# Patient Record
Sex: FEMALE | Race: WHITE | Employment: FULL TIME | URBAN - METROPOLITAN AREA
[De-identification: names, ages, dates, MRNs, and addresses within clinical notes are randomized per-mention and may not be internally consistent; named-entity substitution may affect disease eponyms.]

---

## 2023-02-08 ENCOUNTER — HOSPITAL ENCOUNTER (EMERGENCY)
Age: 38
Discharge: HOME | End: 2023-02-08
Attending: EMERGENCY MEDICINE
Payer: COMMERCIAL

## 2023-02-08 ENCOUNTER — APPOINTMENT (OUTPATIENT)
Dept: GENERAL RADIOLOGY | Age: 38
End: 2023-02-08
Payer: COMMERCIAL

## 2023-02-08 VITALS
DIASTOLIC BLOOD PRESSURE: 79 MMHG | SYSTOLIC BLOOD PRESSURE: 122 MMHG | RESPIRATION RATE: 18 BRPM | HEART RATE: 84 BPM | BODY MASS INDEX: 37.77 KG/M2 | OXYGEN SATURATION: 98 % | TEMPERATURE: 98.1 F | WEIGHT: 235 LBS | HEIGHT: 66 IN

## 2023-02-08 DIAGNOSIS — S89.92XA INJURY OF LEFT KNEE, INITIAL ENCOUNTER: Primary | ICD-10-CM

## 2023-02-08 PROCEDURE — 73502 X-RAY EXAM HIP UNI 2-3 VIEWS: CPT

## 2023-02-08 PROCEDURE — 6360000002 HC RX W HCPCS: Performed by: EMERGENCY MEDICINE

## 2023-02-08 PROCEDURE — 73562 X-RAY EXAM OF KNEE 3: CPT

## 2023-02-08 PROCEDURE — 96372 THER/PROPH/DIAG INJ SC/IM: CPT

## 2023-02-08 PROCEDURE — 99284 EMERGENCY DEPT VISIT MOD MDM: CPT

## 2023-02-08 RX ORDER — KETOROLAC TROMETHAMINE 30 MG/ML
15 INJECTION, SOLUTION INTRAMUSCULAR; INTRAVENOUS ONCE
Status: COMPLETED | OUTPATIENT
Start: 2023-02-08 | End: 2023-02-08

## 2023-02-08 RX ORDER — CLONIDINE HYDROCHLORIDE 0.3 MG/1
0.3 TABLET ORAL 2 TIMES DAILY
COMMUNITY

## 2023-02-08 RX ORDER — GABAPENTIN 300 MG/1
300 CAPSULE ORAL 3 TIMES DAILY
COMMUNITY

## 2023-02-08 RX ORDER — NAPROXEN 500 MG/1
500 TABLET ORAL 2 TIMES DAILY
Qty: 14 TABLET | Refills: 0 | Status: SHIPPED | OUTPATIENT
Start: 2023-02-08

## 2023-02-08 RX ADMIN — KETOROLAC TROMETHAMINE 15 MG: 30 INJECTION, SOLUTION INTRAMUSCULAR at 11:51

## 2023-02-08 ASSESSMENT — PAIN SCALES - GENERAL
PAINLEVEL_OUTOF10: 10
PAINLEVEL_OUTOF10: 10

## 2023-02-08 ASSESSMENT — PAIN DESCRIPTION - ORIENTATION
ORIENTATION: LEFT
ORIENTATION: LEFT

## 2023-02-08 ASSESSMENT — PAIN DESCRIPTION - LOCATION
LOCATION: LEG
LOCATION: LEG

## 2023-02-08 ASSESSMENT — PAIN - FUNCTIONAL ASSESSMENT: PAIN_FUNCTIONAL_ASSESSMENT: 0-10

## 2023-02-08 NOTE — ED PROVIDER NOTES
Emergency Department Encounter    Patient: Lila Doty  MRN: [id number]  : 1985  Date of Evaluation: 2023  ED Provider:  Isabel Matias MD    MDM:    Clinical Impression:  1. Injury of left knee, initial encounter          Triage Chief Complaint: Leg Injury      Patient presents with left lower extremity pain as below. Additional history was obtained from: Patient    I completed a structured, evidence-based clinical evaluation to screen for acute emergent condition that poses a threat to life or bodily function. Diagnostic studies/Differential diagnosis included: X-ray evaluation of the left hip and pelvis as well as the left knee as interpreted by me revealed no evidence of acute fracture or dislocation. On exam, patient was able to stand and bear weight equally on the bilateral lower extremities. Patient is neurovascularly intact with no evidence of acute vascular insufficiency or neurologic injury. Knee is stable with no evidence of ACL, PCL, medial or lateral collateral ligament disruption. Patient has no evidence of patellar tendon injury. No breaks in the skin. No evidence of infectious emergency. Patient declined Ace bandage given latex allergy. She will consider neoprene sleeve. Crutches were provided. Patient will be treated with medications as below. Patient was treated with IM Toradol in the emergency department with some improvement. Patient will follow-up with occupational medicine. Medications ordered in the ED:  ED Medication Orders (From admission, onward)      Start Ordered     Status Ordering Provider    23 1130 23 1126  ketorolac (TORADOL) injection 15 mg  ONCE         Last MAR action: Given - by Vee Herrmann on 23 at [address] [place] [address]                PLAN  Disposition: Patient will be discharged with strict return precautions and follow up instructions.   Discharge - Pending Orders Complete 2023 12:16:38 PM     Disposition referral (if applicable):  [address] [place] [id number]  [phone]  Schedule an appointment as soon as possible for a visit       Medications prescribed (if applicable):  New Prescriptions    NAPROXEN (NAPROSYN) 500 MG TABLET    Take 1 tablet by mouth 2 times daily       Patient will additionally treat with RICE therapy.      ===================================================================    HPI/Pertinent ROS:  Ozzie Casillas is a 44 y.o. female that presents complaining of left lower extremity pain worse in the region of the knee and hip after an injury at work while the patient states that she was putting A 5 Gallon Water Jugs As They Moved on [employer]. Patient States That Her Arm Got Caught between 2 of the Jobs Pulling Her to the Side. Patient States That Her Leg Got Caught between 2 supporting bars. Causing pain. Patient states that she has been afraid to bear weight on the extremity since the injury noting that she heard some pops. No changes in color or sensation of the leg. No other pain or traumatic injury. Pt denies fall. Physical Exam:  Triage VS:    ED Triage Vitals   Enc Vitals Group      BP       Pulse       Resp       Temp       Temp src       SpO2       Weight       Height       Head Circumference       Peak Flow       Pain Score       Pain Loc       Pain Edu? Excl. in [address]? General appearance:  No acute distress. Skin:  Warm. Dry. Eye:  Extraocular movements intact. Ears, nose, mouth and throat:  Oral mucosa moist   Neck:  Trachea midline. Extremity:  No swelling. Normal ROM although there is pain with flexion of the knee and hip. Patient has no ligamentous instability of the ACL, PCL, medial or lateral collateral ligament of the knee. Patient is able to extend the knee against gravity with no evidence of patellar tendon injury. Patient has no pain in the hip with slight rocking of the hip. Pelvis stable. Patient has sensation intact to light touch throughout the left lower extremity. Palpable dorsalis pedis pulse. No breaks in the skin. No skin erythema. Other than tenderness to palpation on the posterior aspect of the left knee, no other bony tenderness to palpation of the long bones or joints of the left lower extremity. Heart:  Regular rate and rhythm, normal S1 & S2, no extra heart sounds. Perfusion:  intact  Respiratory:  Lungs clear to auscultation bilaterally. Respirations nonlabored. Abdominal:  Normal bowel sounds. Soft. Nontender. Non distended. Neurological:  Alert and oriented times 3. No focal neuro deficits. Psychiatric:  Appropriate    Past Medical History:   Diagnosis Date    ADHD     Anxiety     Borderline personality disorder in adult [facility])     Depression     Endometriosis      Past Surgical History:   Procedure Laterality Date    APPENDECTOMY       SECTION      x2    FOOT SURGERY Left     x3, achilles tendon    TONSILLECTOMY       History reviewed. No pertinent family history.   Social History     Socioeconomic History    Marital status: Single     Spouse name: Not on file    Number of children: Not on file    Years of education: Not on file    Highest education level: Not on file   Occupational History    Not on file   Tobacco Use    Smoking status: Former     Types: Cigarettes    Smokeless tobacco: Never   Vaping Use    Vaping Use: Every day   Substance and Sexual Activity    Alcohol use: Never    Drug use: Never    Sexual activity: Not on file   Other Topics Concern    Not on file   Social History Narrative    Not on file     Social Determinants of Health     Financial Resource Strain: Not on file   Food Insecurity: Not on file   Transportation Needs: Not on file   Physical Activity: Not on file   Stress: Not on file   Social Connections: Not on file   Intimate Partner Violence: Not on file   Housing Stability: Not on file     No current facility-administered medications for this encounter. Current Outpatient Medications   Medication Sig Dispense Refill    Cariprazine HCl (VRAYLAR) 6 MG CAPS capsule Take 6 mg by mouth daily      gabapentin (NEURONTIN) 300 MG capsule Take 300 mg by mouth 3 times daily. cloNIDine (CATAPRES) 0.3 MG tablet Take 0.3 mg by mouth 2 times daily      sertraline (ZOLOFT) 50 MG tablet Take 50 mg by mouth daily      naproxen (NAPROSYN) 500 MG tablet Take 1 tablet by mouth 2 times daily 14 tablet 0     Allergies   Allergen Reactions    Latex Other (See Comments)     blisters    Adhesive Tape Other (See Comments)     blisters    Amoxicillin Hives    Penicillins Hives       Nursing Notes Reviewed    I have reviewed and interpreted all of the currently available lab results from this visit (if applicable):  No results found for this visit on 02/08/23. Radiographs (if obtained):  Radiologist's Report Reviewed:  XR KNEE LEFT (3 VIEWS)   Final Result   Apparent multi compartment joint space narrowing which may reflect artifact   associated with suboptimal patient positioning. Otherwise, no acute   radiographic abnormality left knee. XR HIP 2-3 VW W PELVIS LEFT   Final Result   No acute radiographic abnormality bony pelvis or left hip as visualized. Pelvic soft tissue calcifications most consistent with phleboliths measuring   5 mm in greatest dimension. Urinary tract stone or appendicolith cannot be   completely excluded. Comment: Please note this report has been produced using speech recognition software and may contain errors related to that system including errors in grammar, punctuation, and spelling, as well as words and phrases that may be inappropriate. Efforts were made to edit the dictations.         Dale Knapp MD  02/08/23 1171

## 2023-02-08 NOTE — ED NOTES
Pt verbalized understanding of discharge medication/side effects and follow-up care, denies any questions or concerns at this time. Pt demonstrated correct use of crutches; encouraged to return to the ED for any new or worsening symptoms.      Lucio Krabbe, RN  02/08/23 2625

## 2023-02-08 NOTE — DISCHARGE INSTRUCTIONS
Return immediately to the emergency department if you experience new or worsened symptoms, inability to bear weight, changes in color or sensation of your leg, or for any other concerns.

## 2023-02-23 ENCOUNTER — TELEPHONE (OUTPATIENT)
Dept: ORTHOPEDIC SURGERY | Age: 38
End: 2023-02-23

## 2023-02-27 ENCOUNTER — OFFICE VISIT (OUTPATIENT)
Dept: ORTHOPEDIC SURGERY | Age: 38
End: 2023-02-27

## 2023-02-27 VITALS
HEART RATE: 96 BPM | OXYGEN SATURATION: 96 % | HEIGHT: 66 IN | BODY MASS INDEX: 37.77 KG/M2 | DIASTOLIC BLOOD PRESSURE: 82 MMHG | SYSTOLIC BLOOD PRESSURE: 120 MMHG | WEIGHT: 235 LBS

## 2023-02-27 DIAGNOSIS — M23.92 INTERNAL DERANGEMENT OF LEFT KNEE: Primary | ICD-10-CM

## 2023-02-27 RX ORDER — DEXTROAMPHETAMINE SACCHARATE, AMPHETAMINE ASPARTATE MONOHYDRATE, DEXTROAMPHETAMINE SULFATE AND AMPHETAMINE SULFATE 7.5; 7.5; 7.5; 7.5 MG/1; MG/1; MG/1; MG/1
CAPSULE, EXTENDED RELEASE ORAL
COMMUNITY

## 2023-02-27 ASSESSMENT — ENCOUNTER SYMPTOMS
NAUSEA: 0
BACK PAIN: 0
WHEEZING: 0
COLOR CHANGE: 0
VOMITING: 0
VOICE CHANGE: 0
SHORTNESS OF BREATH: 0
COUGH: 0
SORE THROAT: 0

## 2023-02-27 NOTE — PROGRESS NOTES
Patient is a 44y.o. year old female. Patient is in the office today with left knee pain. Patient states that she injured themselves while at work. She states that her arm go pinned between 2 water bottles and her foot was also stuck and her knee twisted. She thinks her knee twisted medially. She states that she felt and heard several pops. Patient states that the injury happened on 2/8/23. She was seen in the ED the same day. X-rays were taken and she was given crutches. She used the crutches for a couple days. She has not been wearing a knee brace. She states that her knee has been giving out and she has pain and creaking with stepping. . Pain scale  0/10. She has increase pain with rotation and stairs. She rates that pain at 5/10. He pain is located inferior to the patella and she reports pressure around her patella. She denies a prior hx of injury or surgery.    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

## 2023-02-27 NOTE — PATIENT INSTRUCTIONS
MRI ordered. Central scheduling [phone]  Knee brace given today. Work note given today. Follow up for MRI results.    Venkat Barcenas  - for [facility] paperwork

## 2023-02-27 NOTE — PROGRESS NOTES
2/27/2023   Chief Complaint   Patient presents with    Injury     Left knee        History of Present Illness:                             Romeo Mao is a 44 y.o. female  referred by emergency room for evaluation and treatment of left knee pain. This is evaluated as a Worker's Compensation injury. Patient states that approximately 3 weeks ago she was injured at work when her knee was stuck between 2 large water containers and she had a twisting injury to the knee that caused a painful pop. She was seen that day at the emergency room and x-rays were negative for any type of fracture and was placed on crutches which she used for several days. She was unable to use a knee brace as it did not fit her proximal thigh. She has been held off of work because of her being on light duty and they cannot accommodate this. She has no advanced imaging thus far. This is my first time seeing the patient. The pain's location is diffusely. she describes the symptoms as aching, sharp and stabbing. Symptoms improve with rest. Symptoms worsen with deep knee bending, getting up from a chair, weight bearing, sitting for prolonged periods of time, twisting activities. Patient denies prior injury to knee, denies numbness, tingling, fever, chills. Patient does admit to prior left Achilles pain and several surgeries but no knee surgery and no prior knee issue. Patient admits to diffuse swelling and minimal.  Patient admits to intermittent and minimal mechanical symptoms. Admits to sensation of instability. Worse with increased activity. Better with rest,     Treatment thus far has included rest, activity modifications, bracing, ice, oral medications without significant relief. Here today to discuss diagnosis and treatment options. Is affecting ADLs. Pain is 6/10 at it's worst.    No advanced imaging thus far    Outside reports reviewed: Emergency room note and imaging reviewed.     Patient's medications, allergies, past medical, surgical, social and family histories were reviewed and updated as appropriate. Medical History  Patient's medications, allergies, past medical, surgical, social and family histories were reviewed and updated as appropriate. Past Medical History:   Diagnosis Date    ADHD     Anxiety     Borderline personality disorder in adult [facility])     Depression     Endometriosis      Past Surgical History:   Procedure Laterality Date    APPENDECTOMY       SECTION      x2    FOOT SURGERY Left     x3, achilles tendon    TONSILLECTOMY       No family history on file. Social History     Socioeconomic History    Marital status: Single   Tobacco Use    Smoking status: Former     Types: Cigarettes    Smokeless tobacco: Never   Vaping Use    Vaping Use: Every day   Substance and Sexual Activity    Alcohol use: Never    Drug use: Never     Current Outpatient Medications   Medication Sig Dispense Refill    amphetamine-dextroamphetamine (ADDERALL XR) 30 MG extended release capsule Take by mouth. Cariprazine HCl (VRAYLAR) 6 MG CAPS capsule Take 6 mg by mouth daily      gabapentin (NEURONTIN) 300 MG capsule Take 300 mg by mouth 3 times daily. cloNIDine (CATAPRES) 0.3 MG tablet Take 0.3 mg by mouth 2 times daily      sertraline (ZOLOFT) 50 MG tablet Take 50 mg by mouth daily      naproxen (NAPROSYN) 500 MG tablet Take 1 tablet by mouth 2 times daily (Patient not taking: Reported on 2023) 14 tablet 0     No current facility-administered medications for this visit. Allergies   Allergen Reactions    Latex Other (See Comments)     blisters    Adhesive Tape Other (See Comments)     blisters    Amoxicillin Hives    Penicillins Hives         Review of Systems   Constitutional:  Positive for activity change. Negative for fatigue and fever. HENT:  Negative for sneezing, sore throat and voice change. Respiratory:  Negative for cough, shortness of breath and wheezing. Cardiovascular:  Negative for leg swelling. Gastrointestinal:  Negative for nausea and vomiting. Musculoskeletal:  Positive for arthralgias, joint swelling and myalgias. Negative for back pain, gait problem, neck pain and neck stiffness. Skin:  Negative for color change, rash and wound. Neurological:  Negative for weakness and numbness. Psychiatric/Behavioral:  Negative for behavioral problems, confusion and self-injury. Examination:  General Exam:  Vitals: /82   Pulse 96   Ht 5' 6\" (1.676 m)   Wt 235 lb (106.6 kg)   LMP 02/08/2023   SpO2 96%   BMI 37.93 kg/m²    Physical Exam  Constitutional:       General: She is not in acute distress. Appearance: Normal appearance. She is obese. HENT:      Head: Normocephalic and atraumatic. Eyes:      General:         Right eye: No discharge. Left eye: No discharge. Extraocular Movements: Extraocular movements intact. Cardiovascular:      Pulses: Normal pulses. Pulmonary:      Effort: Pulmonary effort is normal.      Breath sounds: Normal breath sounds. Musculoskeletal:         General: Swelling, tenderness and signs of injury present. No deformity. Cervical back: Normal range of motion. Right hip: Normal.      Left hip: Normal.      Right upper leg: Normal.      Left upper leg: Normal.      Left knee: Swelling, bony tenderness and crepitus present. No deformity, effusion, erythema or ecchymosis. Normal range of motion. Tenderness present over the medial joint line and lateral joint line. No LCL laxity, MCL laxity, ACL laxity or PCL laxity. Abnormal meniscus. Normal alignment and normal patellar mobility. Normal pulse. Instability Tests: Anterior drawer test negative. Posterior drawer test negative. Anterior Lachman test negative. Medial Dinesh test positive and lateral Dinesh test positive. Right lower leg: Normal. No edema.       Left lower leg: Normal. No edema.      Right ankle: Normal.      Left ankle: Normal.      Right foot: Normal.      Left foot: Normal.   Skin:     General: Skin is warm and dry. Capillary Refill: Capillary refill takes less than 2 seconds. Neurological:      General: No focal deficit present. Mental Status: She is alert and oriented to person, place, and time. Mental status is at baseline. Gait: Gait normal.   Psychiatric:         Mood and Affect: Mood normal.         Behavior: Behavior normal.      LEFT KNEE EXAMINATION       OBSERVATION / INSPECTION     Gait: Mildly antalgic    Alignment: Neutral     Scars: None     Muscle atrophy: None    Effusion: Minimal    Warmth: None     Discoloration: none       TENDERNESS / CREPITUS (T / C): Patella - / -     Lateral joint line + / -  Medial joint line  + / -     Peripatellar lateral - / -  Peripatellar medial  - / -     Medial plica - / -  Lateral plica - / -    Patellar tendon - / -   Prepatellar Bursa - / -     Popliteal fossa - / -    Gastrocnemius - / -    Quadricep - / -   Quad tendon - / -      Tibial tubercle - / -     Thigh/hamstring - / -  Pes anserine/HS - / -     ITB - / -     Tibia - / -   Fibula - / -    Tib/fib joint - / -     MFC - / -    LFC - / -     MCL: Proximal - / -  Distal - / -    LCL - / -    MCL - / -      ROM: (* = pain)  PASSIVE  ACTIVE     Left :    0 / 145  0 / 145      Right :    0 / 145  0 / 145      PATELLOFEMORAL EXAMINATION:    See above noted areas of tenderness.      Patella position     Subluxation / dislocation: Centered     Sup. / Inf; Normal     Crepitus (PF): Mild    Patellar Mobility:     Medial-lateral: Normal     Superior-inferior: Normal     Inferior tilt Normal     Patellar tendon: Normal     Lateral tilt: Normal     J-sign: None     Patellofemoral grind: Mild pain         MENISCAL SIGNS:     Pain on terminal extension: -    Pain on terminal flexion: -    Dineshs maneuver: Positive for pain only medially and laterally    Squat: Not tested      LIGAMENT EXAMINATION:    ACL / Lachman: normal but difficult to perform due to patient's size and guarding    PCL-Post.  drawer: normal 0 to 2mm    MCL- Valgus: normal 0 to 2mm    LCL- Varus:  normal 0 to 2mm      STRENGTH: (* = with pain) PAINFUL SIDE    Quadricep 5/5    Hamstrin/5      EXTREMITY NEURO-VASCULAR EXAMINATION:     Sensation:  Grossly intact to light touch all dermatomal regions. Motor Function:  Fully intact motor function at hip, knee, foot and ankle      DTRs;  quadriceps and  achilles 2+. No clonus and downgoing Babinski. Vascular status:  DP and PT pulses 2+, brisk capillary refill, symmetric. Diagnostic testing:  X-ray images were reviewed by myself and discussed with the patient:  3 views of the left knee from emergency room demonstrate:  No radiographic evidence of acute fracture, dislocation, joint effusion, bone   destruction, malignant-appearing periosteal reaction. Apparent joint space   narrowing noted which may partially reflect projection artifact due to   positioning. No radiographic evidence of acute soft tissue abnormality. Office Procedures:  No orders of the defined types were placed in this encounter. Assessment and Plan    A: Left knee internal derangement    P:   I had a thorough conversation with the patient regarding her left knee x-ray imaging as well as her treatment course. I explained that it is difficult to truly assess for any type of ACL tear due to her significant guarding but it does not appear to be significantly torn as I cannot appear to get an endpoint. However, I explained that there are times where the swelling as well as the guarding can give a false negative test and I do feel that proceeding with an MRI was appropriate as she also has significant joint line pain and mechanical symptoms. At this time we will proceed with MRI of the left knee.   Until it is completed and she follows up in office she will continue restrictions including no weightbearing knee bending but she can do knee bending and range of motion was seated. She will also avoid any type of exercise, high-impact activity or heavy lifting. We will also place her in a knee brace today from her office which did seem to fit better. She will follow-up after MRI is completed. She will continue with ice and over-the-counter pain medication for pain control. All questions were answered and patient voices her understanding.     Electronically signed by Marge King DO on 2/27/2023 at 1:16 PM

## 2023-02-27 NOTE — LETTER
[facility]  [address] [place] [address]  Phone: [phone]  Fax: [phone]    Barry Palmer DO        February 27, 2023     Patient: Anjali Rosenberg   YOB: 1985   Date of Visit: 2/27/2023       To Whom It May Concern: It is my medical opinion that Anjali Rosenberg may return to light duty immediately with the following restrictions: no weight bearing squating, exercise and must be able to take breaks as needed. Must wear brace while at work. .    If you have any questions or concerns, please don't hesitate to call.     Sincerely,        Barry Palmer DO

## 2023-02-28 ENCOUNTER — HOSPITAL ENCOUNTER (OUTPATIENT)
Dept: MRI IMAGING | Age: 38
Discharge: HOME | End: 2023-02-28
Payer: COMMERCIAL

## 2023-02-28 DIAGNOSIS — M23.92 INTERNAL DERANGEMENT OF LEFT KNEE: ICD-10-CM

## 2023-02-28 PROCEDURE — 73721 MRI JNT OF LWR EXTRE W/O DYE: CPT

## 2023-03-01 ENCOUNTER — OFFICE VISIT (OUTPATIENT)
Dept: ORTHOPEDIC SURGERY | Age: 38
End: 2023-03-01

## 2023-03-01 ENCOUNTER — TELEPHONE (OUTPATIENT)
Dept: ORTHOPEDIC SURGERY | Age: 38
End: 2023-03-01

## 2023-03-01 VITALS
HEIGHT: 66 IN | BODY MASS INDEX: 37.77 KG/M2 | SYSTOLIC BLOOD PRESSURE: 139 MMHG | WEIGHT: 235 LBS | HEART RATE: 96 BPM | OXYGEN SATURATION: 98 % | DIASTOLIC BLOOD PRESSURE: 90 MMHG

## 2023-03-01 DIAGNOSIS — S83.512A COMPLETE TEAR OF ANTERIOR CRUCIATE LIGAMENT OF LEFT KNEE, INITIAL ENCOUNTER: Primary | ICD-10-CM

## 2023-03-01 ASSESSMENT — ENCOUNTER SYMPTOMS
BACK PAIN: 0
VOMITING: 0
SHORTNESS OF BREATH: 0
NAUSEA: 0
VOICE CHANGE: 0
COUGH: 0
WHEEZING: 0
SORE THROAT: 0
COLOR CHANGE: 0

## 2023-03-01 NOTE — LETTER
March 1, 2023       Amparo Arreaga YOB: 1985   [place] [id number] Date of Visit:  3/1/2023       To Whom It May Concern: It is my medical opinion that Amparo Arreaga should remain out of work until until cleared by physician after surgery. .    If you have any questions or concerns, please don't hesitate to call.     Sincerely,        Lexy People, DO

## 2023-03-01 NOTE — PATIENT INSTRUCTIONS
Surgery discussed, office will call patient with surgery date      We are committed to providing you the best care possible. If you receive a survey after visiting one of our offices, please take time to share your experience concerning your physician office visit. These surveys are confidential and no health information about you is shared. We are eager to improve for you and we are counting on your feedback to help make that happen.

## 2023-03-01 NOTE — PROGRESS NOTES
3/1/2023   Chief Complaint   Patient presents with    Follow-up     F/U MRI RESULTS L KNEE        Updated HPI: Patient is here for follow-up left knee including MRI review. She has no new complaints. She has been utilizing a knee brace but still has issues with it sliding down like the other one was. No new injuries or complaints. She has been limiting her activities per my request.  Continues with issues with sensation of instability but no mechanical symptoms at this time. No changes in her health history. Previous HPI (2/7/2023):                             Fabian Johnson is a 44 y.o. female  referred by emergency room for evaluation and treatment of left knee pain. This is evaluated as a Worker's Compensation injury. Patient states that approximately 3 weeks ago she was injured at work when her knee was stuck between 2 large water containers and she had a twisting injury to the knee that caused a painful pop. She was seen that day at the emergency room and x-rays were negative for any type of fracture and was placed on crutches which she used for several days. She was unable to use a knee brace as it did not fit her proximal thigh. She has been held off of work because of her being on light duty and they cannot accommodate this. She has no advanced imaging thus far. This is my first time seeing the patient. The pain's location is diffusely. she describes the symptoms as aching, sharp and stabbing. Symptoms improve with rest. Symptoms worsen with deep knee bending, getting up from a chair, weight bearing, sitting for prolonged periods of time, twisting activities. Patient denies prior injury to knee, denies numbness, tingling, fever, chills. Patient does admit to prior left Achilles pain and several surgeries but no knee surgery and no prior knee issue. Patient admits to diffuse swelling and minimal.  Patient admits to intermittent and minimal mechanical symptoms.   Admits to sensation of instability. Worse with increased activity. Better with rest,     Treatment thus far has included rest, activity modifications, bracing, ice, oral medications without significant relief. Here today to discuss diagnosis and treatment options. Is affecting ADLs. Pain is 6/10 at it's worst.    No advanced imaging thus far    Outside reports reviewed: Emergency room note and imaging reviewed. Patient's medications, allergies, past medical, surgical, social and family histories were reviewed and updated as appropriate. Medical History  Patient's medications, allergies, past medical, surgical, social and family histories were reviewed and updated as appropriate. Past Medical History:   Diagnosis Date    ADHD     Anxiety     Borderline personality disorder in adult [facility])     Depression     Endometriosis      Past Surgical History:   Procedure Laterality Date    APPENDECTOMY       SECTION      x2    FOOT SURGERY Left     x3, achilles tendon    TONSILLECTOMY       No family history on file. Social History     Socioeconomic History    Marital status: Single   Tobacco Use    Smoking status: Former     Types: Cigarettes    Smokeless tobacco: Never   Vaping Use    Vaping Use: Every day   Substance and Sexual Activity    Alcohol use: Never    Drug use: Never     Current Outpatient Medications   Medication Sig Dispense Refill    amphetamine-dextroamphetamine (ADDERALL XR) 30 MG extended release capsule Take by mouth. Cariprazine HCl (VRAYLAR) 6 MG CAPS capsule Take 6 mg by mouth daily      gabapentin (NEURONTIN) 300 MG capsule Take 300 mg by mouth 3 times daily.       cloNIDine (CATAPRES) 0.3 MG tablet Take 0.3 mg by mouth 2 times daily      sertraline (ZOLOFT) 50 MG tablet Take 50 mg by mouth daily      naproxen (NAPROSYN) 500 MG tablet Take 1 tablet by mouth 2 times daily (Patient not taking: Reported on 2023) 14 tablet 0     No current facility-administered medications for this visit. Allergies   Allergen Reactions    Latex Other (See Comments)     blisters    Adhesive Tape Other (See Comments)     blisters    Amoxicillin Hives    Penicillins Hives         Review of Systems   Constitutional:  Positive for activity change. Negative for fatigue and fever. HENT:  Negative for sneezing, sore throat and voice change. Respiratory:  Negative for cough, shortness of breath and wheezing. Cardiovascular:  Negative for leg swelling. Gastrointestinal:  Negative for nausea and vomiting. Musculoskeletal:  Positive for arthralgias, joint swelling and myalgias. Negative for back pain, gait problem, neck pain and neck stiffness. Skin:  Negative for color change, rash and wound. Neurological:  Negative for weakness and numbness. Psychiatric/Behavioral:  Negative for behavioral problems, confusion and self-injury. Examination:  General Exam:  Vitals: BP (!) 139/90 (Site: Right Upper Arm, Position: Sitting)   Pulse 96   Ht 5' 6\" (1.676 m)   Wt 235 lb (106.6 kg) Comment: PATIENT REPORTED  LMP 02/08/2023   SpO2 98%   BMI 37.93 kg/m²    Physical Exam  Constitutional:       General: She is not in acute distress. Appearance: Normal appearance. She is obese. HENT:      Head: Normocephalic and atraumatic. Eyes:      General:         Right eye: No discharge. Left eye: No discharge. Extraocular Movements: Extraocular movements intact. Cardiovascular:      Pulses: Normal pulses. Pulmonary:      Effort: Pulmonary effort is normal.      Breath sounds: Normal breath sounds. Musculoskeletal:         General: Swelling, tenderness and signs of injury present. No deformity. Cervical back: Normal range of motion. Right hip: Normal.      Left hip: Normal.      Right upper leg: Normal.      Left upper leg: Normal.      Left knee: Swelling, bony tenderness and crepitus present.  No deformity, effusion, erythema or ecchymosis. Normal range of motion. Tenderness present over the medial joint line and lateral joint line. ACL laxity present. No LCL laxity, MCL laxity or PCL laxity. Normal alignment, normal meniscus and normal patellar mobility. Normal pulse. Instability Tests: Anterior drawer test positive. Posterior drawer test negative. Anterior Lachman test negative. Medial Dinesh test negative and lateral Dinesh test negative. Right lower leg: Normal. No edema. Left lower leg: Normal. No edema. Right ankle: Normal.      Left ankle: Normal.      Right foot: Normal.      Left foot: Normal.   Skin:     General: Skin is warm and dry. Capillary Refill: Capillary refill takes less than 2 seconds. Neurological:      General: No focal deficit present. Mental Status: She is alert and oriented to person, place, and time. Mental status is at baseline. Gait: Gait normal.   Psychiatric:         Mood and Affect: Mood normal.         Behavior: Behavior normal.      LEFT KNEE EXAMINATION       OBSERVATION / INSPECTION     Gait: Mildly antalgic    Alignment: Neutral     Scars: None     Muscle atrophy: None    Effusion: Minimal    Warmth: None     Discoloration: none       TENDERNESS / CREPITUS (T / C):       Patella - / -     Lateral joint line + / -  Medial joint line  + / -     Peripatellar lateral - / -  Peripatellar medial  - / -     Medial plica - / -  Lateral plica - / -    Patellar tendon - / -   Prepatellar Bursa - / -     Popliteal fossa - / -    Gastrocnemius - / -    Quadricep - / -   Quad tendon - / -      Tibial tubercle - / -     Thigh/hamstring - / -  Pes anserine/HS - / -     ITB - / -     Tibia - / -   Fibula - / -    Tib/fib joint - / -     MFC - / -    LFC - / -     MCL: Proximal - / -  Distal - / -    LCL - / -    MCL - / -      ROM: (* = pain)  PASSIVE  ACTIVE     Left :    0 / 145  0 / 145      Right :    0 / 145  0 / 145      PATELLOFEMORAL EXAMINATION:    See above noted areas of tenderness. Patella position     Subluxation / dislocation: Centered     Sup. / Inf; Normal     Crepitus (PF): Mild    Patellar Mobility:     Medial-lateral: Normal     Superior-inferior: Normal     Inferior tilt Normal     Patellar tendon: Normal     Lateral tilt: Normal     J-sign: None     Patellofemoral grind: Mild pain         MENISCAL SIGNS:     Pain on terminal extension: -    Pain on terminal flexion: -    Dineshs maneuver: Positive for pain only medially and laterally    Squat: Not tested      LIGAMENT EXAMINATION:    ACL / Lachman: 1B but continued guarding    PCL-Post.  drawer: normal 0 to 2mm    MCL- Valgus: normal 0 to 2mm    LCL- Varus:  normal 0 to 2mm      STRENGTH: (* = with pain) PAINFUL SIDE    Quadricep 5/5    Hamstrin/5      EXTREMITY NEURO-VASCULAR EXAMINATION:     Sensation:  Grossly intact to light touch all dermatomal regions. Motor Function:  Fully intact motor function at hip, knee, foot and ankle      DTRs;  quadriceps and  achilles 2+. No clonus and downgoing Babinski. Vascular status:  DP and PT pulses 2+, brisk capillary refill, symmetric. Diagnostic testing:  MRI left knee imaging without contrast were reviewed by myself and discussed with the patient:  MENISCI: Intact medial and lateral menisci. CRUCIATE LIGAMENTS: There is an acute tear of the ACL. PCL is intact. EXTENSOR MECHANISM: Intact quadriceps and patellar tendons. Intact patellar   retinacula. LATERAL COLLATERAL LIGAMENT COMPLEX: Intact IT band, lateral collateral   ligament proper, biceps femoris tendon and popliteus tendon. MEDIAL COLLATERAL LIGAMENT COMPLEX: The superficial and deep components of   the medial collateral ligament are intact. KNEE JOINT: There is a moderate joint effusion. Joint alignment is   maintained. Minimal tricompartmental degenerative changes. No erosions are   present.        BONE MARROW: There are acute medial and lateral compartment marrow   contusions. No evidence of displaced fracture or osteochondral defect. Small nondisplaced subchondral fracture involving the posterior margin of the   lateral tibial plateau. Previous imaging:   3 views of the left knee from emergency room demonstrate:  No radiographic evidence of acute fracture, dislocation, joint effusion, bone   destruction, malignant-appearing periosteal reaction. Apparent joint space   narrowing noted which may partially reflect projection artifact due to   positioning. No radiographic evidence of acute soft tissue abnormality. Office Procedures:  No orders of the defined types were placed in this encounter. Assessment and Plan    A: Left knee ACL tear    P:   I discussed with the patient pursuing  conservative measures versus operative intervention and what both treatment plans would encompass, as well as the benefits and negatives of both interventions. Due to the patient's significant symptoms including stability and pain, they would like to proceed with surgical intervention. I did discuss with the patient the use of hamstring or bone patellar bone autograft as well as cadaver allograft at this time because of the patient's age and activity level, we will proceed with allograft. She voiced understanding in regards to the use of allograft during the case. I discussed surgical intervention in the form of:    Left Knee arthroscopic:  ACL reconstruction utilizing quadriceps allograft      I explained risks, benefits, possible complications of the procedure and answered all questions for the patient. This will include but is not limited to need for further surgery, blood loss, continued pain and instability, neurovascular injury, infection, extremity loss, death. I explained postoperative rehabilitation protocol and expectations with the patient today. The patient understands and consents to the procedure. Patient will not need clearance. We will have to schedule the case once it is cleared by [facility].     Electronically signed by Rossana Brown DO on 3/1/2023 at 3:08 PM

## 2023-03-01 NOTE — PROGRESS NOTES
Patient returns to office today to follow up for MRI RESULTS L KNEE     Patient reports 4/10 pain. Patient states that knee brace is falling down and wants to know if we can give her a bigger size brace    MRI RESULTS    1. Acute ACL tear. 2. Medial and lateral compartment marrow contusions with small nondisplaced   subchondral impaction fracture involving the posterior margin of the lateral   tibial plateau. 3. Moderate joint effusion.

## 2023-03-01 NOTE — TELEPHONE ENCOUNTER
[facility] [phone]  Fax [phone]    S83. 92XA SPRAIN OF UNSPECIFIED SITE OF LEFT KNEE LEFT  ALLOWED 2/23/2023    Internal claim # [id number]

## 2023-03-09 ENCOUNTER — TELEPHONE (OUTPATIENT)
Dept: ORTHOPEDIC SURGERY | Age: 38
End: 2023-03-09

## 2023-03-09 NOTE — TELEPHONE ENCOUNTER
Patent needs approval from [facility] for surgery.     CPT 99049 with Allograft  [id number]

## 2023-03-13 NOTE — TELEPHONE ENCOUNTER
[address] for addl code completed and faxed to [facility] No

## 2023-03-17 NOTE — TELEPHONE ENCOUNTER
Patient called and left message that her surgery was approved by [facility] and would like to be scheduled. I will call call her on Monday.

## 2023-03-17 NOTE — TELEPHONE ENCOUNTER
I called the [address]. Phone # [phone]. Internal claim # [id number]    Message requested an update on the addl dx code that our office requested.

## 2023-03-20 ENCOUNTER — OFFICE VISIT (OUTPATIENT)
Dept: ORTHOPEDIC SURGERY | Age: 38
End: 2023-03-20

## 2023-03-20 ENCOUNTER — TELEPHONE (OUTPATIENT)
Dept: ORTHOPEDIC SURGERY | Age: 38
End: 2023-03-20

## 2023-03-20 VITALS — HEART RATE: 110 BPM | OXYGEN SATURATION: 96 % | DIASTOLIC BLOOD PRESSURE: 76 MMHG | SYSTOLIC BLOOD PRESSURE: 110 MMHG

## 2023-03-20 DIAGNOSIS — S83.512A RUPTURE OF ANTERIOR CRUCIATE LIGAMENT OF LEFT KNEE, INITIAL ENCOUNTER: Primary | ICD-10-CM

## 2023-03-20 DIAGNOSIS — S83.512D COMPLETE TEAR OF ANTERIOR CRUCIATE LIGAMENT OF LEFT KNEE, SUBSEQUENT ENCOUNTER: Primary | ICD-10-CM

## 2023-03-20 RX ORDER — AZITHROMYCIN 250 MG/1
1 TABLET, FILM COATED ORAL DAILY
COMMUNITY
Start: 2023-03-18

## 2023-03-20 RX ORDER — CEFDINIR 300 MG/1
300 CAPSULE ORAL 2 TIMES DAILY
COMMUNITY
Start: 2023-03-16 | End: 2023-03-20 | Stop reason: CLARIF

## 2023-03-20 ASSESSMENT — ENCOUNTER SYMPTOMS
VOICE CHANGE: 0
NAUSEA: 0
SHORTNESS OF BREATH: 0
COUGH: 0
BACK PAIN: 0
SORE THROAT: 0
VOMITING: 0
COLOR CHANGE: 0
WHEEZING: 0

## 2023-03-20 NOTE — PATIENT INSTRUCTIONS
We will schedule surgery at soonest convenience.  If you have any questions regarding your surgery please call our office and ask to speak with Lidia Napier [phone]

## 2023-03-20 NOTE — PROGRESS NOTES
Patient states she was on methimazole 5 mg for 10 days but she was having an upset stomach and states it was causing a pain in the stents in her neck to hurt. Patient was unable to provide specific symptoms. States she stopped taking the methimazole about \"a week and a half ago\" and now she feels fine. Informed this information will be passed on to MD for review.       
Patient, 44year old female, is here to discuss surgery for left knee ACL. She states this is approved through [facility]. No new injuries, numbness or tingling. Pain 3.10 today. Already in post op brace.
Pt called stating that she is having complications with her new medication methIMAzole (TAPAZOLE) 5 MG tablet. Pt is requesting a call from the nurse to discuss other options. PT stopped medication for now.   
Returned call to patient. Patient agreed to resume methimazole 5mg QOD. Instructed to call right away if she is having any issues. Patient agreed with plan.     MAR updated.  
See if she can try taking it  1/2 tab daily or 1 tab every other day   
reconstruction utilizing quadriceps allograft      I explained risks, benefits, possible complications of the procedure and answered all questions for the patient. This will include but is not limited to need for further surgery, blood loss, continued pain and instability, neurovascular injury, infection, extremity loss, death. I explained postoperative rehabilitation protocol and expectations with the patient today. The patient understands and consents to the procedure. Patient will not need clearance. Apparently the case has been cleared by [facility] but we are awaiting the official paperwork. We will go ahead and schedule her at this time. Patient states that she has a possible metal allergy but has never been tested. I did discuss this with her and this is not a true documented allergy but she uses plastic piercings because of this. I have reached out to the [employer] reps and we will try to determine if there is any alternative to the metal aspects of the suspensory fixation. Patient also states she has adhesive allergy and I will make sure that we avoid using any type of adhesive or glue during the surgery.     Electronically signed by Meena Marin DO on 3/20/2023 at 9:08 AM

## 2023-03-20 NOTE — TELEPHONE ENCOUNTER
Scheduled patient in office for LT ACL Reconstruction (Allograft) on 4/11/23 at [facility]. Patient voiced understanding of date/time and instructions. Procedure request faxed. Patient states she does not have a PCP. She just recently moved to [place] from [facility] and has not been able to get established anywhere. This surgery is to be done thru [facility]    Claim#: [id number]    [facility]#: [id number]

## 2023-04-11 PROBLEM — S83.242A ACUTE MEDIAL MENISCUS TEAR OF LEFT KNEE: Status: ACTIVE | Noted: 2023-04-11

## 2023-04-18 ENCOUNTER — HOSPITAL ENCOUNTER (OUTPATIENT)
Dept: PHYSICAL THERAPY | Age: 38
Setting detail: THERAPIES SERIES
Discharge: HOME | End: 2023-04-18
Payer: COMMERCIAL

## 2023-04-18 PROCEDURE — 97161 PT EVAL LOW COMPLEX 20 MIN: CPT

## 2023-04-18 PROCEDURE — 97110 THERAPEUTIC EXERCISES: CPT

## 2023-04-18 PROCEDURE — 97016 VASOPNEUMATIC DEVICE THERAPY: CPT

## 2023-04-18 ASSESSMENT — PAIN SCALES - GENERAL: PAINLEVEL_OUTOF10: 7

## 2023-04-18 ASSESSMENT — PAIN DESCRIPTION - PAIN TYPE: TYPE: ACUTE PAIN

## 2023-04-18 NOTE — PLAN OF CARE
Manual Therapy               [] Aquatic Therapy       Other:          Frequency/Duration:  # Days per week: [] 1 day # Weeks: [] 1 week [] 5 weeks     [x] 2 days   [] 2 weeks [x] 6 weeks     [] 3 days   [] 3 weeks [] 7 weeks     [] 4 days   [] 4 weeks [] 8 weeks         [] 9 weeks [] 10 weeks         [] 11 weeks [] 12 weeks    Rehab Potential/Progress: [] Excellent [x] Good [] Fair  [] Poor     Goals:    Patient goals: improve function and return to work full time  Short term goals  Time Frame for Short term goals: 10 weeks  Pt demo I w/ HEP and symptom management  Pt demo LEFS score >40/80 to improve tolerance to ADL's  Pt demo ability to ambulate >800 ft in 6 minutes without crutches and brace, pain at 0/10  Pt demo >4/5 LLE strength in all directions to improve tolerance to ADL's  Pt demo x10 SLR without quad lag and pain at 0/10  Long Term Goals  Time Frame for Long Term Goals: 20 weeks  Pt demo LEFS score >50/80 to improve tolerance to ADL's  Pt demo ability to ascend/descend 1 flight of stairs with x1 handrail and reciprocal pattern  Pt is able to return in full duty to work with pain <3/10  Pt reports ability to walk >1 mile without brace or crutches and pain at 0/10    Electronically signed by:  Juan Lundy PT, DPT, 4/18/2023, 2:17 PM        If you have any questions or concerns, please don't hesitate to call.   Thank you for your referral.      Physician Signature:________________________________Date:_________ TIME: _____  By signing above, therapists plan is approved by physician

## 2023-04-18 NOTE — PROGRESS NOTES
quad lag and pain at 0/10 New                                     Long Term Goals Completed by 20 weeks Goal Status   Pt demo LEFS score >50/80 to improve tolerance to ADL's New   Pt demo ability to ascend/descend 1 flight of stairs with x1 handrail and reciprocal pattern New   Pt is able to return in full duty to work with pain <3/10 New   Pt reports ability to walk >1 mile without brace or crutches and pain at 0/10 New     New                                      TREATMENT PLAN       Requires PT Follow-Up: Yes    Pt. actively involved in establishing Plan of Care and Goals: Yes  Patient/ Caregiver education and instruction: Goals, PT Role, Plan of Care, Evaluative findings, Home Exercise Program, Weight-bearing Education, Pressure Relief, Gait Training, Anatomy of condition, Disease Specific Education, Energy Conservation, General Safety, Body mechanics discussed 50% WB with crutches locked in brace, sleeping in brace, frequency of HEP, driving           Treatment may include any combination of the following: Current Treatment Recommendations: Strengthening, ROM, Balance training, Home exercise program, Therapeutic activities, Gait training, Stair training, Transfer training, Neuromuscular re-education, Manual, Modalities, Pain management, Endurance training, Return to work related activity     Frequency / Duration:  Patient to be seen 2x for 20 weeks weeks      Eval Complexity:    Decision Making: Low Complexity     Therapist Signature: Lieutenant Escobedo PT    Date: 3/36/6030     I certify that the above Therapy Services are being furnished while the patient is under my care. I agree with the treatment plan and certify that this therapy is necessary.       Physician's Signature:  ___________________________   Date:_______                                                                   Snow Azul DO        Physician Comments: _______________________________________________    Please sign and return to [facility]

## 2023-04-18 NOTE — FLOWSHEET NOTE
measurements around extremity were pre:43 cm/ post 41 cm  See subjective and assessment for pre and post treatment patient reported pain levels. Communication with other providers:  eval sent 4/18/23      Assessment:  (Response towards treatment session) (Pain Rating)  Assessment: Pt is 44year old female who underwent a left knee ACL repair utilizing quadriceps allograft with meniscus repair on 4/11/23 and is currently one week post-op. Patient arrives with her brace donned and crutches, currently not bearing weight. Discussed progressions to 50% WB in brace w/ crutches. Provided home program for patient to complete daily. Demonstrated how patient can sit with brace unlocked at 90 deg. She has been icing frequently at home. Pt will benefit with PT services with progression of strength/ROM, manual, electrical stimulation, balance/proprioception, and modalities to return to PLOF.       Plan for Next Session: --       Time In / Time Out:    1592-2732      Timed Code/Total Treatment Minutes:   (1) PT eval  (1) TE  (1) vaso      Next Progress Note due:  10th visit       Plan of Care Interventions:  [x] Therapeutic Exercise  [] Modalities:  [x] Therapeutic Activity     [] Ultrasound  [x] Estim  [x] Gait Training      [] Cervical Traction [] Lumbar Traction  [x] Neuromuscular Re-education    [] Cold/hotpack [] Iontophoresis   [x] Instruction in HEP      [x] Vasopneumatic   [] Dry Needling    [x] Manual Therapy               [] Aquatic Therapy              Electronically signed by:  Lieutenant Fanny PT, 4/18/2023, 2:17 PM

## 2023-04-20 ENCOUNTER — HOSPITAL ENCOUNTER (OUTPATIENT)
Dept: PHYSICAL THERAPY | Age: 38
Setting detail: THERAPIES SERIES
Discharge: HOME | End: 2023-04-20
Payer: COMMERCIAL

## 2023-04-20 PROCEDURE — 97110 THERAPEUTIC EXERCISES: CPT

## 2023-04-20 PROCEDURE — 97112 NEUROMUSCULAR REEDUCATION: CPT

## 2023-04-20 PROCEDURE — 97016 VASOPNEUMATIC DEVICE THERAPY: CPT

## 2023-04-20 NOTE — FLOWSHEET NOTE
currently not bearing weight. Discussed progressions to 50% WB in brace w/ crutches. Provided home program for patient to complete daily. Demonstrated how patient can sit with brace unlocked at 90 deg. She has been icing frequently at home. Pt will benefit with PT services with progression of strength/ROM, manual, electrical stimulation, balance/proprioception, and modalities to return to PLOF. Subjective:  Abimbola Campo reports pain at 6/10 this morning. She has been trying to walk at home, but it has been hard for her to get her foot down ont he ground. Any changes in Ambulatory Summary Sheet? None      Objective:  See eval     LIMIT flexion to 90 deg for first 6 weeks   Brace locked at 0 deg for ambulation, 0-90 deg at rest  50% WB w/ crutches for 6 weeks     Knee Extension: 4 deg lacking from zero   Knee Flexion: 82 deg AAROM seated w/ contralateral side       Exercises: (No more than 4 columns)   Exercise/Equipment 4/18/23 #1 4/20/23 #2 Date      4/18/23=1 wk 4/25=2wks    WARM UP      Nu Step    NEXT brace unlocked to 90 deg Lv3 5'          TABLE      PROM knee flexion 0-90 deg      *Quad Set X10 3\" E-stim 5' w/ towel    *SLR 20 deg only in brace Unable at this time    Heel Slide X10 5\" X10 5\"    SL Hip ABD         Add Squeeze       Seated Knee Flexion X10 5\" X10 5\"    Supine TKE   E-stim GTB 5'    Heel Prop  2' w/ quad set    STANDING      DL Heel Raises  NEXT    Gait Training w/ crutches 20 ft with 50% WB Arrival of session around clinic with brace + crutches, 50% WB                                       PROPRIOCEPTION      DLS airex       Weight Shifts  X10 A/P + lateral X20 A/P + lateral                       MODALITIES      vaso  10'              Other Therapeutic Activities/Education:  Patient received education on their current pathology and how their condition effects them with their functional activities. Patient understood discussion and questions were answered.  Patient understands their

## 2023-04-25 ENCOUNTER — HOSPITAL ENCOUNTER (OUTPATIENT)
Dept: PHYSICAL THERAPY | Age: 38
Setting detail: THERAPIES SERIES
Discharge: HOME | End: 2023-04-25
Payer: COMMERCIAL

## 2023-04-25 PROCEDURE — 97110 THERAPEUTIC EXERCISES: CPT

## 2023-04-25 PROCEDURE — 97140 MANUAL THERAPY 1/> REGIONS: CPT

## 2023-04-25 PROCEDURE — 97530 THERAPEUTIC ACTIVITIES: CPT

## 2023-04-25 PROCEDURE — G0283 ELEC STIM OTHER THAN WOUND: HCPCS

## 2023-04-25 NOTE — FLOWSHEET NOTE
Outpatient Physical Therapy  [place]           [x] Phone: [phone]   Fax: [phone]  [facility]           [] Phone: [phone]   Fax: [phone]        Physical Therapy Daily Treatment Note  Date:  2023    Patient Name:  Amos Elise    :  1985  MRN: [id number]  Restrictions/Precautions: SEE Dr. Raúl Aguirre Protocol in folder  Diagnosis:   Rupture of anterior cruciate ligament of left knee, initial encounter [L69.614B]    Date of Injury/Surgery: 23  Treatment Diagnosis:  Decreased LLE WB tolerance, strength, mobility  Insurance/Certification information: [facility] APPROVED 12 VISITS BY 23  Referring Physician:  DO Dr. Raúl Garrison   PCP: No primary care provider on file. Next Doctor Visit:  --  Plan of care signed (Y/N):  N, sent 23  Outcome Measure: LEFS: see folder  Visit# / total visits:   3/12 by 23  Pain level: 4/10   Goals:     Patient goals: improve function and return to work full time  Short term goals  Time Frame for Short term goals: 10 weeks  Pt demo I w/ HEP and symptom management  Pt demo LEFS score >40/80 to improve tolerance to ADL's  Pt demo ability to ambulate >800 ft in 6 minutes without crutches and brace, pain at 0/10  Pt demo >4/5 LLE strength in all directions to improve tolerance to ADL's  Pt demo x10 SLR without quad lag and pain at 0/10  Long Term Goals  Time Frame for Long Term Goals: 20 weeks  Pt demo LEFS score >50/80 to improve tolerance to ADL's  Pt demo ability to ascend/descend 1 flight of stairs with x1 handrail and reciprocal pattern  Pt is able to return in full duty to work with pain <3/10  Pt reports ability to walk >1 mile without brace or crutches and pain at 0/10      Summary of Evaluation:  Assessment: Pt is 44year old female who underwent a left knee ACL repair utilizing quadriceps allograft with meniscus repair on 23 and is currently one week post-op.  Patient arrives with her brace donned and crutches,

## 2023-04-26 ENCOUNTER — OFFICE VISIT (OUTPATIENT)
Dept: ORTHOPEDIC SURGERY | Age: 38
End: 2023-04-26

## 2023-04-26 VITALS — DIASTOLIC BLOOD PRESSURE: 86 MMHG | SYSTOLIC BLOOD PRESSURE: 122 MMHG | OXYGEN SATURATION: 98 % | HEART RATE: 120 BPM

## 2023-04-26 DIAGNOSIS — Z98.890 S/P ACL RECONSTRUCTION: Primary | ICD-10-CM

## 2023-04-26 RX ORDER — LISDEXAMFETAMINE DIMESYLATE 40 MG/1
1 CAPSULE ORAL DAILY
COMMUNITY
Start: 2023-04-20

## 2023-04-26 RX ORDER — HYDROCODONE BITARTRATE AND ACETAMINOPHEN 5; 325 MG/1; MG/1
1 TABLET ORAL EVERY 6 HOURS PRN
Qty: 28 TABLET | Refills: 0 | Status: SHIPPED | OUTPATIENT
Start: 2023-04-26 | End: 2023-05-03

## 2023-04-26 RX ORDER — CYCLOBENZAPRINE HCL 5 MG
5 TABLET ORAL 2 TIMES DAILY PRN
Qty: 30 TABLET | Refills: 0 | Status: SHIPPED | OUTPATIENT
Start: 2023-04-26 | End: 2023-05-06

## 2023-04-26 NOTE — PROGRESS NOTES
Patient is here for 2 week post op check on left ACL repair DOS 4/11/23. Patient denies new injury. No numbness or tingling. Some numbness with icing. Has been wearing brace as directed. Complaints: During PT yesterday, therapist was moving kneecap around and now she feels a grinding but is not painful. Her whole left leg is swelling as well but no visible swelling today. Is getting baptized this weekend, asking if she needs to keep her knee covered for this. Questions: Return to work timeline. Asking about muscle relaxers to take after PT for pain.
motion as outlined in the protocol  -rest/elevation as needed  -DVT prophylaxis: Continue  mg daily  -Refills for Flexeril and Norco sent to pharmacy  -Discussed restrictions including no soaking the wound  -Continue off work at this time. I explained to the patient that she should expect to not return to work for at least 3 months postop but would likely be longer and she voiced understanding.   We will provide appropriate documentation as needed  -f/u in 4 week(s)  -f/u sooner prn any issues

## 2023-04-29 ENCOUNTER — HOSPITAL ENCOUNTER (OUTPATIENT)
Dept: PHYSICAL THERAPY | Age: 38
Setting detail: THERAPIES SERIES
Discharge: HOME | End: 2023-04-29
Payer: COMMERCIAL

## 2023-04-29 PROCEDURE — 97110 THERAPEUTIC EXERCISES: CPT

## 2023-04-29 PROCEDURE — 97140 MANUAL THERAPY 1/> REGIONS: CPT

## 2023-04-29 PROCEDURE — 97112 NEUROMUSCULAR REEDUCATION: CPT

## 2023-04-29 PROCEDURE — 97016 VASOPNEUMATIC DEVICE THERAPY: CPT

## 2023-04-29 NOTE — FLOWSHEET NOTE
Outpatient Physical Therapy  [place]           [x] Phone: [phone]   Fax: [phone]  [facility]           [] Phone: [phone]   Fax: [phone]        Physical Therapy Daily Treatment Note  Date:  2023    Patient Name:  Aleida Aguila    :  1985  MRN: [id number]  Restrictions/Precautions: SEE Dr. Anil Jin Protocol in folder  Diagnosis:   Rupture of anterior cruciate ligament of left knee, initial encounter [N35.285D]    Date of Injury/Surgery: 23  Treatment Diagnosis:  Decreased LLE WB tolerance, strength, mobility  Insurance/Certification information: [facility] APPROVED 12 VISITS BY 23  Referring Physician:  DO Dr. Anil Boyle   PCP: No primary care provider on file. Next Doctor Visit:  --  Plan of care signed (Y/N):  N, sent 23  Outcome Measure: LEFS: see folder  Visit# / total visits:    by 23  Pain level: 6-7/10   Goals:     Patient goals: improve function and return to work full time  Short term goals  Time Frame for Short term goals: 10 weeks  Pt demo I w/ HEP and symptom management  Pt demo LEFS score >40/80 to improve tolerance to ADL's  Pt demo ability to ambulate >800 ft in 6 minutes without crutches and brace, pain at 0/10  Pt demo >4/5 LLE strength in all directions to improve tolerance to ADL's  Pt demo x10 SLR without quad lag and pain at 0/10  Long Term Goals  Time Frame for Long Term Goals: 20 weeks  Pt demo LEFS score >50/80 to improve tolerance to ADL's  Pt demo ability to ascend/descend 1 flight of stairs with x1 handrail and reciprocal pattern  Pt is able to return in full duty to work with pain <3/10  Pt reports ability to walk >1 mile without brace or crutches and pain at 0/10      Summary of Evaluation:  Assessment: Pt is 44year old female who underwent a left knee ACL repair utilizing quadriceps allograft with meniscus repair on 23 and is currently one week post-op.  Patient arrives with her brace donned and crutches,

## 2023-05-03 ENCOUNTER — HOSPITAL ENCOUNTER (OUTPATIENT)
Dept: PHYSICAL THERAPY | Age: 38
Setting detail: THERAPIES SERIES
Discharge: HOME | End: 2023-05-03
Payer: COMMERCIAL

## 2023-05-03 PROCEDURE — 97110 THERAPEUTIC EXERCISES: CPT

## 2023-05-03 PROCEDURE — 97016 VASOPNEUMATIC DEVICE THERAPY: CPT

## 2023-05-03 PROCEDURE — 97112 NEUROMUSCULAR REEDUCATION: CPT

## 2023-05-03 NOTE — FLOWSHEET NOTE
[id number]        Timed Code/Total Treatment Minutes:   45'/55'  (2) TE  (1) NEURO  (1) VASO      Next Progress Note due:  10th visit       Plan of Care Interventions:  [x] Therapeutic Exercise  [] Modalities:  [x] Therapeutic Activity     [] Ultrasound  [x] Estim  [x] Gait Training      [] Cervical Traction [] Lumbar Traction  [x] Neuromuscular Re-education    [] Cold/hotpack [] Iontophoresis   [x] Instruction in HEP      [x] Vasopneumatic   [] Dry Needling    [x] Manual Therapy               [] Aquatic Therapy              Electronically signed by:  Tony Wells PT,DPT5/3/2023, 8:31 AM        5/3/2023,8:31 AM

## 2023-05-05 ENCOUNTER — HOSPITAL ENCOUNTER (OUTPATIENT)
Dept: PHYSICAL THERAPY | Age: 38
Setting detail: THERAPIES SERIES
Discharge: HOME | End: 2023-05-05
Payer: COMMERCIAL

## 2023-05-05 PROCEDURE — 97112 NEUROMUSCULAR REEDUCATION: CPT

## 2023-05-05 PROCEDURE — 97110 THERAPEUTIC EXERCISES: CPT

## 2023-05-05 PROCEDURE — 97016 VASOPNEUMATIC DEVICE THERAPY: CPT

## 2023-05-05 NOTE — FLOWSHEET NOTE
Outpatient Physical Therapy  [place]           [x] Phone: [phone]   Fax: [phone]  [facility]           [] Phone: [phone]   Fax: [phone]        Physical Therapy Daily Treatment Note  Date:  2023    Patient Name:  Angeles Thacker    :  1985  MRN: [id number]  Restrictions/Precautions: SEE Dr. Terence Klinefelter Protocol in folder  Diagnosis:   Rupture of anterior cruciate ligament of left knee, initial encounter [O51.580I]    Date of Injury/Surgery: 23  Treatment Diagnosis:  Decreased LLE WB tolerance, strength, mobility  Insurance/Certification information: [facility] APPROVED 12 VISITS BY 23  Referring Physician:  Abbey Duncan, DO Dr. Terence Klinefelter   PCP: No primary care provider on file. Next Doctor Visit:  --  Plan of care signed (Y/N):  Y  Outcome Measure: LEFS: see folder  Visit# / total visits:    by 23  Pain level: 0/10       Goals:     Patient goals: improve function and return to work full time  Short term goals  Time Frame for Short term goals: 10 weeks  Pt demo I w/ HEP and symptom management reports compliance 5/3/23  Pt demo LEFS score >40/80 to improve tolerance to ADL's  Pt demo ability to ambulate >800 ft in 6 minutes without crutches and brace, pain at 0/10  Pt demo >4/5 LLE strength in all directions to improve tolerance to ADL's  Pt demo x10 SLR without quad lag and pain at 0/10 Min lag 5/5  Long Term Goals  Time Frame for Long Term Goals: 20 weeks  Pt demo LEFS score >50/80 to improve tolerance to ADL's  Pt demo ability to ascend/descend 1 flight of stairs with x1 handrail and reciprocal pattern  Pt is able to return in full duty to work with pain <3/10  Pt reports ability to walk >1 mile without brace or crutches and pain at 0/10      Summary of Evaluation:  Assessment: Pt is 44year old female who underwent a left knee ACL repair utilizing quadriceps allograft with meniscus repair on 23 and is currently one week post-op.  Patient arrives with her brace

## 2023-05-11 ENCOUNTER — HOSPITAL ENCOUNTER (OUTPATIENT)
Dept: PHYSICAL THERAPY | Age: 38
Setting detail: THERAPIES SERIES
Discharge: HOME | End: 2023-05-11
Payer: COMMERCIAL

## 2023-05-11 PROCEDURE — 97112 NEUROMUSCULAR REEDUCATION: CPT

## 2023-05-11 PROCEDURE — 97110 THERAPEUTIC EXERCISES: CPT

## 2023-05-11 PROCEDURE — 97016 VASOPNEUMATIC DEVICE THERAPY: CPT

## 2023-05-11 NOTE — FLOWSHEET NOTE
Outpatient Physical Therapy  [place]           [x] Phone: [phone]   Fax: [phone]  [facility]           [] Phone: [phone]   Fax: [phone]        Physical Therapy Daily Treatment Note  Date:  2023    Patient Name:  Liban Ayon    :  1985  MRN: [id number]  Restrictions/Precautions: SEE Dr. Dana Whittaker Protocol in folder  Diagnosis:   Rupture of anterior cruciate ligament of left knee, initial encounter [O20.935Y]    Date of Injury/Surgery: 23  Treatment Diagnosis:  Decreased LLE WB tolerance, strength, mobility  Insurance/Certification information: [facility] APPROVED 12 VISITS BY 23  Referring Physician:  DO Dr. Dana Nascimento   PCP: No primary care provider on file. Next Doctor Visit:  --  Plan of care signed (Y/N):  Y  Outcome Measure: LEFS: see folder  Visit# / total visits:    by 23  Pain level: 0/10       Goals:     Patient goals: improve function and return to work full time  Short term goals  Time Frame for Short term goals: 10 weeks  Pt demo I w/ HEP and symptom management reports compliance 5/3/23  Pt demo LEFS score >40/80 to improve tolerance to ADL's  Pt demo ability to ambulate >800 ft in 6 minutes without crutches and brace, pain at 0/10  Pt demo >4/5 LLE strength in all directions to improve tolerance to ADL's  Pt demo x10 SLR without quad lag and pain at 0/10 Min lag 5/5  Long Term Goals  Time Frame for Long Term Goals: 20 weeks  Pt demo LEFS score >50/80 to improve tolerance to ADL's  Pt demo ability to ascend/descend 1 flight of stairs with x1 handrail and reciprocal pattern  Pt is able to return in full duty to work with pain <3/10  Pt reports ability to walk >1 mile without brace or crutches and pain at 0/10      Summary of Evaluation:  Assessment: Pt is 44year old female who underwent a left knee ACL repair utilizing quadriceps allograft with meniscus repair on 23 and is currently one week post-op.  Patient arrives with her

## 2023-05-18 ENCOUNTER — HOSPITAL ENCOUNTER (OUTPATIENT)
Dept: PHYSICAL THERAPY | Age: 38
Discharge: HOME | End: 2023-05-18

## 2023-05-18 NOTE — FLOWSHEET NOTE
Physical Therapy  Cancellation/No-show Note  Patient Name:  Ovidio Rojas  :  1985   Date:  2023  Cancelled visits to date: 1  No-shows to date: 2    For today's appointment patient:  [x]  Cancelled  []  Rescheduled appointment  []  No-show     Reason given by patient:  []  Patient ill  []  Conflicting appointment  []  No transportation    []  Conflict with work  []  No reason given  [x]  Other:    Comments:  Has transferred care to another facility closer to home.      Electronically signed by:  Mindi Toro PTA      9:14 AM  2023

## 2023-05-24 ENCOUNTER — OFFICE VISIT (OUTPATIENT)
Dept: ORTHOPEDIC SURGERY | Age: 38
End: 2023-05-24

## 2023-05-24 ENCOUNTER — TELEPHONE (OUTPATIENT)
Dept: ORTHOPEDIC SURGERY | Age: 38
End: 2023-05-24

## 2023-05-24 VITALS — DIASTOLIC BLOOD PRESSURE: 88 MMHG | OXYGEN SATURATION: 97 % | HEART RATE: 77 BPM | SYSTOLIC BLOOD PRESSURE: 132 MMHG

## 2023-05-24 DIAGNOSIS — Z98.890 S/P ACL RECONSTRUCTION: Primary | ICD-10-CM

## 2023-05-24 PROCEDURE — 99024 POSTOP FOLLOW-UP VISIT: CPT | Performed by: STUDENT IN AN ORGANIZED HEALTH CARE EDUCATION/TRAINING PROGRAM

## 2023-05-24 NOTE — PROGRESS NOTES
Patient is here for 6 week post op check on left ACL repair DOS 4/11/23. Patient has no complaints, questions, or concerns,. Pain minimal and improving. No pain today. Swelling decreasing. No numbness or tingling. No injuries. Has been wearing brace and wrap.

## 2023-05-24 NOTE — PROGRESS NOTES
Date of surgery: 4-     Procedure:  1. Diagnostic and operative arthroscopy of left knee with ACL  reconstruction using quadriceps tendon allograft size 9.0 utilizing Arthrex quadriceps tendon harvesting system. 2.  Left knee arthroscopy with medial meniscus repair utilizing Arthrex all suture meniscal repair system  x 1      History:  Patient is here in follow up regarding their 6-week postop visit for above left knee procedure    Patient is doing well. They have 0/10 pain currently and states that this has been well controlled for the last several weeks. They deny chest pain, SOB, calf pain,fever,wound drainage. No other issues. Patient denies any constitutional symptoms. She feels that she has been progressing well and is very happy with her progress thus far. She does admit that physical therapy has been interrupted as she did move approximately 3 hours away but has reestablished physical therapy care in her new home town. Patient states they have been compliant with restrictions. She states that physical therapy is going well    Patient states they have been using the brace as ordered    Patient has been ambulating with crutches and has been compliant with limiting her weightbearing    Patient has been taking  mg daily for DVT prophylaxis     Physical:   Patient demonstrates appropriate mood and affect. Hinged knee brace removed for examination and placed back on the knee after examination complete    Left lower extremity exam:   The incisions are well-healed and are clean, dry, intact, and nontender with no erythema. They have no edema, the Leg compartments are soft . There are No cords or calf tenderness. No significant calf/ankle edema. They are neurovascularly intact distally.      Range of motion of the left knee demonstrates: 0-90 without pain    Minimal rosemary-incisional tenderness to palpation    Lachman's 1A with no mechanical symptoms with range of motion    Imaging:   No

## 2023-05-24 NOTE — PATIENT INSTRUCTIONS
Follow up in 6 weeks. Please bring Physical Therapy notes from [facility] to your next visit.

## 2023-07-10 ENCOUNTER — OFFICE VISIT (OUTPATIENT)
Dept: ORTHOPEDIC SURGERY | Age: 38
End: 2023-07-10

## 2023-07-10 VITALS — BODY MASS INDEX: 40.31 KG/M2 | OXYGEN SATURATION: 99 % | HEIGHT: 66 IN | HEART RATE: 87 BPM | WEIGHT: 250.8 LBS

## 2023-07-10 DIAGNOSIS — Z98.890 S/P ACL RECONSTRUCTION: Primary | ICD-10-CM

## 2023-07-10 PROCEDURE — 99024 POSTOP FOLLOW-UP VISIT: CPT | Performed by: STUDENT IN AN ORGANIZED HEALTH CARE EDUCATION/TRAINING PROGRAM

## 2023-07-10 RX ORDER — MELOXICAM 15 MG/1
15 TABLET ORAL DAILY PRN
Qty: 30 TABLET | Refills: 0 | Status: SHIPPED | OUTPATIENT
Start: 2023-07-10

## 2023-07-10 NOTE — PROGRESS NOTES
Patient comes in today for a 12 week post-op check for left ACL repair on 4/11/23. She was last seen in our office on 5/24/23. She rates her pain at 2/10 today. She is still bruising around her VMO and would like to know why. She is also asking about pain meds that are not narcotics. She states that she is currently in PT and doing well. She denies any new falls or injuries.

## 2023-07-10 NOTE — PROGRESS NOTES
Date of surgery: 4-     Procedure:  1. Diagnostic and operative arthroscopy of left knee with ACL  reconstruction using quadriceps tendon allograft size 9.0 utilizing Arthrex quadriceps tendon harvesting system. 2.  Left knee arthroscopy with medial meniscus repair utilizing Arthrex all suture meniscal repair system  x 1      History:  Patient is here in follow up regarding their 12-week postop visit for above left knee procedure    Patient is doing well. They have 2/10 pain currently and states that her pain continues to be well controlled. Her only concern is some bruising around the VMO from an unknown issue otherwise she is doing well. They deny chest pain, SOB, calf pain,fever,wound drainage. No other issues. Patient denies any constitutional symptoms. She feels that she has been progressing well and is very happy with her progress thus far. She states that her physical therapy has been not problematic but she does not feel that she is being pushed very hard and that there focuses more on geriatric type patients and she is looking to move her physical therapy to a more aggressive facility as she would like to progress quicker. Patient states they have been compliant with restrictions. She states that physical therapy is going well    No new injuries or instability events since last being seen. Denies any mechanical symptoms. Patient states they have been using the brace as ordered    Has been ambulating without knee brace and crutches without issue. Physical:   Patient demonstrates appropriate mood and affect. Left lower extremity exam:   The incisions are well-healed and are clean, dry, intact, and nontender with no erythema. They have no edema, the Leg compartments are soft . There are No cords or calf tenderness. Minimal bruising around VMO but no pain. No significant calf/ankle edema. They are neurovascularly intact distally.      Range of motion of the left knee

## 2023-07-11 ENCOUNTER — TELEPHONE (OUTPATIENT)
Dept: ORTHOPEDIC SURGERY | Age: 38
End: 2023-07-11

## 2023-07-11 NOTE — TELEPHONE ENCOUNTER
Pt's [facility] NCM called stating that the pt wants to RTW with restrictions. I called the pt and confirmed that she wants to RTW with restrictions of no standing greater the 45 min with a 15 min break. New [employer] completed and faxed.

## 2023-08-23 ENCOUNTER — OFFICE VISIT (OUTPATIENT)
Dept: ORTHOPEDIC SURGERY | Age: 38
End: 2023-08-23

## 2023-08-23 VITALS — RESPIRATION RATE: 14 BRPM | WEIGHT: 250 LBS | HEIGHT: 66 IN | BODY MASS INDEX: 40.18 KG/M2

## 2023-08-23 DIAGNOSIS — Z98.890 S/P ACL RECONSTRUCTION: ICD-10-CM

## 2023-08-23 RX ORDER — MELOXICAM 15 MG/1
15 TABLET ORAL DAILY PRN
Qty: 30 TABLET | Refills: 0 | Status: SHIPPED | OUTPATIENT
Start: 2023-08-23

## 2023-08-23 ASSESSMENT — ENCOUNTER SYMPTOMS
COLOR CHANGE: 0
BACK PAIN: 0
SORE THROAT: 0
NAUSEA: 0
WHEEZING: 0
COUGH: 0
VOICE CHANGE: 0
VOMITING: 0
SHORTNESS OF BREATH: 0

## 2023-08-23 NOTE — PROGRESS NOTES
8/23/2023   No chief complaint on file. History of Present Illness:                             Nahed Murphy is a 43 y.o. female       Date of surgery: 4-     Procedure:  1. Diagnostic and operative arthroscopy of left knee with ACL  reconstruction using quadriceps tendon allograft size 9.0 utilizing Arthrex quadriceps tendon harvesting system. 2.  Left knee arthroscopy with medial meniscus repair utilizing Arthrex all suture meniscal repair system  x 1      History:  Patient is here in follow up regarding their 4 month postop visit for above left knee procedure    Patient is doing well. They have 3/10 pain currently but states that it has been increased recently up to a 5 as she has been doing more activity at work. She left the previous job which was under the [employer] injury and switch jobs which does not give her the light duty that she needs and she states that this has been very aggravating to her knee. She had no new injuries but again states that she is on her feet and probably doing more than she should and her knee feels very painful and sore after a long day at work. She attempted to ice the knee while at work. She was using a knee brace which was previously provided by myself but she states that she ripped the brace. She has no new injuries or any sensation of instability. They deny chest pain, SOB, calf pain,fever,wound drainage. No other issues. Patient denies any constitutional symptoms. She admits that she has missed some physical therapy and this is somewhat limited her. She states that is because she has been working more and has missed appointments because of this as well as the fact that she has moved to [facility] recently. Patient states they have been compliant with restrictions.   She states that physical therapy is going well when she does go and she tries to do the exercises on her own but states she has not been very compliant with

## 2023-08-23 NOTE — PROGRESS NOTES
Patient is here today for a post op check for her left knee ACL repair 4/11/23. She states that her pain level is 5/10 but increases with activity. She has been completing some physical therapy but her work schedule has made this difficult to attend. She is standing on her feet all day with frequent breaks. She is also on hourly restrictions. 2 consecutive days 4 days a week 10 hr days.

## 2023-08-23 NOTE — PATIENT INSTRUCTIONS
Compound cream Prescription  Hold off on cortisone injection  We will return in 8 weeks we will discuss cortisone injection at a time  She will call if her knee pain worsens and she wants injection and we will move up her appointment  Focus on physical therapy and less work  Figure out light duty at work

## 2023-09-07 ENCOUNTER — TELEPHONE (OUTPATIENT)
Dept: ORTHOPEDIC SURGERY | Age: 38
End: 2023-09-07

## 2023-09-07 NOTE — TELEPHONE ENCOUNTER
Just spoke with patient about the swelling that she is having in her knee. She states that the swelling will go down once she is off of it but comes back as soon as she is standing on it. She denies an new injury but states that she has been on it a lot. She thinks she may be over doing it. She is asking for a new work note. Her follow up appointment on 10/18/23 was moved up to 9/15/23. Please advise.

## 2023-09-07 NOTE — TELEPHONE ENCOUNTER
Regarding: FW: Rdoy Denis   Contact: [phone]  Can you reach out to this patient to get more specifics on what is going on and see if we can get her in sometime the next 1 to 2 weeks for reevaluation.  ----- Message -----  From: Oskar Luciano MA  Sent: 9/6/2023   8:19 AM EDT  To: Aaron Haro DO  Subject: Rody Denis                                ----- Message from Oskar Luciano MA sent at 9/6/2023  8:19 AM EDT -----       ----- Message from [id number] to Aaron HaroDO sent at 9/5/2023 11:49 AM -----   Hello, I'm getting ahold of you in regards to my acl surgery back on April 11th. I had my follow up on 8,23,23. We had talked about me going back on workers comp. To help heal my knee better. We talked about less work and more physical therapy. My workers comp nurse told me to ask my physical therapist for a at home care because she wasn't sure if working and walking two days on and two days off would be enough exercise to strengthen it. ? Over the holiday weekend I had off I was taking it at easy and I woke up Saturday and couldn't apply pressure on my knee? It was swollen and did subside within two days but my concern is that even with the light duty I'm harming it more? Please get back to me and advise me what dr Felisa Espinal would like to do in regards to this situation.    Thanks   Rody Denis

## 2023-09-08 ENCOUNTER — HOSPITAL ENCOUNTER (OUTPATIENT)
Dept: DATA CONVERSION | Facility: HOSPITAL | Age: 38
End: 2023-09-08

## 2023-09-08 DIAGNOSIS — G47.33 OBSTRUCTIVE SLEEP APNEA (ADULT) (PEDIATRIC): ICD-10-CM

## 2023-09-25 ENCOUNTER — HOSPITAL ENCOUNTER (OUTPATIENT)
Dept: DATA CONVERSION | Facility: HOSPITAL | Age: 38
End: 2023-09-25

## 2023-09-25 DIAGNOSIS — M67.01 SHORT ACHILLES TENDON (ACQUIRED), RIGHT ANKLE: ICD-10-CM

## 2023-09-28 DIAGNOSIS — Z98.890 S/P ACL RECONSTRUCTION: ICD-10-CM

## 2023-10-05 RX ORDER — MELOXICAM 15 MG/1
15 TABLET ORAL DAILY PRN
Qty: 30 TABLET | Refills: 0 | Status: SHIPPED | OUTPATIENT
Start: 2023-10-05

## 2023-10-09 ENCOUNTER — OFFICE VISIT (OUTPATIENT)
Dept: ORTHOPEDIC SURGERY | Age: 38
End: 2023-10-09

## 2023-10-09 VITALS — HEART RATE: 90 BPM | SYSTOLIC BLOOD PRESSURE: 130 MMHG | OXYGEN SATURATION: 98 % | DIASTOLIC BLOOD PRESSURE: 86 MMHG

## 2023-10-09 DIAGNOSIS — Z98.890 S/P ACL RECONSTRUCTION: Primary | ICD-10-CM

## 2023-10-09 RX ORDER — MELOXICAM 15 MG/1
15 TABLET ORAL DAILY PRN
Qty: 30 TABLET | Refills: 2 | Status: SHIPPED | OUTPATIENT
Start: 2023-10-09 | End: 2024-01-07

## 2023-10-09 RX ORDER — TRIAMCINOLONE ACETONIDE 40 MG/ML
40 INJECTION, SUSPENSION INTRA-ARTICULAR; INTRAMUSCULAR ONCE
Status: COMPLETED | OUTPATIENT
Start: 2023-10-09 | End: 2023-10-09

## 2023-10-09 RX ADMIN — TRIAMCINOLONE ACETONIDE 40 MG: 40 INJECTION, SUSPENSION INTRA-ARTICULAR; INTRAMUSCULAR at 15:31

## 2023-10-09 ASSESSMENT — ENCOUNTER SYMPTOMS
WHEEZING: 0
COUGH: 0
COLOR CHANGE: 0
VOMITING: 0
BACK PAIN: 0
NAUSEA: 0
SORE THROAT: 0
VOICE CHANGE: 0
SHORTNESS OF BREATH: 0

## 2023-10-09 NOTE — PROGRESS NOTES
Patient comes in today for a 6 month follow up post left ACL repair on 4/11/23. She was last seen in our office on 8/23/23 She rates her pain at 2/10 today. She reports medial sided pain and buckling with prolonged walking. She reports that she has not been working since September 8th. She states that her work was pushing her and it was causing more pain. She denies any new injuries or falls. Previous work restrictions: It is my medical opinion that Jude is to work 2 consecutive days at a time, 4 days a week, for 10 hr days.  Please allow her to stand for 45 minutes at a time with a 15 minute rest.
Procedure Details   Verbal consent was obtained for the procedure. The knee was prepped with alcohol. A 22 gauge needle was advanced into the lateral joint space through lateral infrapatellar approach without difficulty The space was then injected with 1 ml 1% lidocaine and 1 ml 0.5% marcaine and 1 ml of triamcinolone (KENALOG) 40mg/ml. The injection site was cleansed with isopropyl alcohol and a dressing was applied. Complications: None; patient tolerated the procedure well. Symptoms were improved immediately after the injection. Assessment and Plan    A: 6-month status post left knee ACL reconstruction, medial meniscus repair     P:   I had a thorough conversation with the patient as well as her partner and the . I did explain that that her main issue is her lack of physical therapy and regaining her full quad strength which is something she needs to continue working on. However, time and pain have been a limiting factor and at this time she is requesting a cortisone injection which I do feel is appropriate at this time. This was performed out complication and patient did have immediate moderate pain relief. I did discuss returning to formal therapy but patient like to hold off at this time. She will do the exercises as previously provided for quad strengthening and we did provide her with new exercises today. She will continue using her knee brace. I did explain that there is mild concern for tearing of her previous meniscal repair but at this time she is having no mechanical symptoms and I am not significantly concerned for retear but this is something we will continue to consider and we will order an MRI in the future if her pain does not improve. She will continue her previously provided work restrictions and attempt to return to work and she can call if she needs any minor modifications in order for her work to be able to accept these restrictions.   [facility]

## 2023-11-15 ENCOUNTER — TELEPHONE (OUTPATIENT)
Dept: ORTHOPEDIC SURGERY | Age: 38
End: 2023-11-15

## 2023-11-15 NOTE — TELEPHONE ENCOUNTER
Called to reschedule missed appointment. She states that she was unable to make it for the appointment. She reports a fall but states that she is doing well. The injection helped but is wearing off. She would like to do exercises at home. I will be emailing her exercises. She also reports changing jobs to a more sedentary job and that is helping her knee.

## 2023-11-30 DIAGNOSIS — J45.909 REACTIVE AIRWAY DISEASE WITHOUT COMPLICATION, UNSPECIFIED ASTHMA SEVERITY, UNSPECIFIED WHETHER PERSISTENT (HHS-HCC): ICD-10-CM

## 2023-11-30 DIAGNOSIS — J30.2 SEASONAL ALLERGIES: Primary | ICD-10-CM

## 2023-11-30 DIAGNOSIS — J30.2 OTHER SEASONAL ALLERGIC RHINITIS: ICD-10-CM

## 2023-12-05 PROBLEM — J30.2 SEASONAL ALLERGIES: Status: ACTIVE | Noted: 2023-12-05

## 2023-12-05 RX ORDER — MONTELUKAST SODIUM 10 MG/1
10 TABLET ORAL DAILY
Qty: 90 TABLET | Refills: 0 | Status: SHIPPED | OUTPATIENT
Start: 2023-12-05 | End: 2024-06-06

## 2023-12-15 ENCOUNTER — SCHEDULED TELEPHONE ENCOUNTER (OUTPATIENT)
Dept: ORTHOPEDIC SURGERY | Age: 38
End: 2023-12-15

## 2023-12-15 DIAGNOSIS — Z98.890 S/P ACL RECONSTRUCTION: Primary | ICD-10-CM

## 2023-12-15 NOTE — PROGRESS NOTES
12/15/2023   No chief complaint on file. History of Present Illness:                             Markus Rob is a 43 y.o. female       Date of surgery: 2023     Procedure:  1. Diagnostic and operative arthroscopy of left knee with ACL  reconstruction using quadriceps tendon allograft size 9.0 utilizing [employer] quadriceps tendon harvesting system. 2.  Left knee arthroscopy with medial meniscus repair utilizing [employer] all suture meniscal repair system  x 1      History:  Patient i requested virtual visit for her 8 month postop visit for above left knee procedure    Overall, the patient does feel that she is improving. She does have some continued quad weakness which she states she continues to work on. She once again changed jobs and is now doing a position that requires less standing and physical type work she is able to sit more which has been very helpful. She did have a slip several weeks ago and had some increased knee pain since then but no true injury to the knee and no increased sensation of instability since then. She states that the prior knee cortisone injection was very helpful but is since worn off. She does not feel that she needs another 1 at this time. They deny chest pain, SOB, calf pain,fever,wound drainage. No other issues. Patient denies any constitutional symptoms. Patient states they have been compliant with restrictions. Again, she has been trying to do the physical therapy exercises on her own. She has not been doing any formal therapy since last being seen      Medical History  Patient's medications, allergies, past medical, surgical, social and family histories were reviewed and updated as appropriate.     Past Medical History:   Diagnosis Date    ADHD     Anxiety     Borderline personality disorder in adult [facility])     Depression     Endometriosis      Past Surgical History:   Procedure Laterality Date    APPENDECTOMY

## 2024-01-09 ENCOUNTER — TELEPHONE (OUTPATIENT)
Dept: ORTHOPEDIC SURGERY | Age: 39
End: 2024-01-09

## 2024-01-09 NOTE — TELEPHONE ENCOUNTER
Patient called and stated that during her last telephone visit, she requested a return to work letter with no restrictions. She has still not received the letter, and would like one e-mailed to her at [email or web address]

## 2024-01-10 ENCOUNTER — TELEMEDICINE (OUTPATIENT)
Dept: PRIMARY CARE | Facility: CLINIC | Age: 39
End: 2024-01-10
Payer: COMMERCIAL

## 2024-01-10 DIAGNOSIS — Z20.822 CLOSE EXPOSURE TO COVID-19 VIRUS: Primary | ICD-10-CM

## 2024-01-10 PROBLEM — L50.9 URTICARIA: Status: ACTIVE | Noted: 2024-01-10

## 2024-01-10 PROBLEM — E55.9 VITAMIN D DEFICIENCY: Status: ACTIVE | Noted: 2024-01-10

## 2024-01-10 PROBLEM — F17.219 CIGARETTE NICOTINE DEPENDENCE WITH NICOTINE-INDUCED DISORDER: Status: ACTIVE | Noted: 2024-01-10

## 2024-01-10 PROBLEM — J32.9 CHRONIC SINUSITIS: Status: ACTIVE | Noted: 2024-01-10

## 2024-01-10 PROBLEM — K21.9 GASTROESOPHAGEAL REFLUX DISEASE: Status: ACTIVE | Noted: 2024-01-10

## 2024-01-10 PROBLEM — F41.1 GENERALIZED ANXIETY DISORDER: Status: ACTIVE | Noted: 2021-11-19

## 2024-01-10 PROBLEM — Z86.59: Status: ACTIVE | Noted: 2021-11-19

## 2024-01-10 PROBLEM — G47.33 OSA (OBSTRUCTIVE SLEEP APNEA): Status: ACTIVE | Noted: 2024-01-10

## 2024-01-10 PROBLEM — E78.5 HYPERLIPIDEMIA: Status: ACTIVE | Noted: 2024-01-10

## 2024-01-10 PROBLEM — F32.9 MAJOR DEPRESSIVE DISORDER: Status: ACTIVE | Noted: 2021-08-30

## 2024-01-10 PROBLEM — F31.9 BIPOLAR DISORDER (MULTI): Status: ACTIVE | Noted: 2024-01-10

## 2024-01-10 PROBLEM — R53.83 FATIGUE: Status: ACTIVE | Noted: 2024-01-10

## 2024-01-10 PROCEDURE — 99214 OFFICE O/P EST MOD 30 MIN: CPT | Performed by: FAMILY MEDICINE

## 2024-01-10 RX ORDER — DEXTROAMPHETAMINE SULFATE, DEXTROAMPHETAMINE SACCHARATE, AMPHETAMINE SULFATE AND AMPHETAMINE ASPARTATE 7.5; 7.5; 7.5; 7.5 MG/1; MG/1; MG/1; MG/1
30 CAPSULE, EXTENDED RELEASE ORAL EVERY MORNING
COMMUNITY

## 2024-01-10 RX ORDER — NIRMATRELVIR AND RITONAVIR 300-100 MG
3 KIT ORAL 2 TIMES DAILY
Qty: 30 TABLET | Refills: 0 | Status: SHIPPED | OUTPATIENT
Start: 2024-01-10 | End: 2024-01-15

## 2024-01-10 RX ORDER — CARIPRAZINE 6 MG/1
6 CAPSULE, GELATIN COATED ORAL DAILY
COMMUNITY

## 2024-01-10 RX ORDER — ONDANSETRON 4 MG/1
4 TABLET, FILM COATED ORAL EVERY 8 HOURS PRN
Qty: 21 TABLET | Refills: 0 | Status: SHIPPED | OUTPATIENT
Start: 2024-01-10 | End: 2024-01-17

## 2024-01-10 RX ORDER — CLONIDINE HYDROCHLORIDE 0.2 MG/1
0.2 TABLET ORAL 3 TIMES DAILY
COMMUNITY

## 2024-01-10 RX ORDER — DEXTROAMPHETAMINE SACCHARATE, AMPHETAMINE ASPARTATE, DEXTROAMPHETAMINE SULFATE AND AMPHETAMINE SULFATE 1.25; 1.25; 1.25; 1.25 MG/1; MG/1; MG/1; MG/1
5 TABLET ORAL
COMMUNITY
Start: 2023-10-09

## 2024-01-10 ASSESSMENT — PATIENT HEALTH QUESTIONNAIRE - PHQ9
1. LITTLE INTEREST OR PLEASURE IN DOING THINGS: NOT AT ALL
SUM OF ALL RESPONSES TO PHQ9 QUESTIONS 1 AND 2: 0
2. FEELING DOWN, DEPRESSED OR HOPELESS: NOT AT ALL

## 2024-01-10 ASSESSMENT — PAIN SCALES - GENERAL: PAINLEVEL: 0-NO PAIN

## 2024-01-10 NOTE — ASSESSMENT & PLAN NOTE
- With current acute symptoms and close contact exposure for COVID-19, symptoms are highly suspicious for COVID-19 infection and we will treat accordingly as you are unable to access COVID-19 test at this time  -Continue to focus on supportive care including rest, hydration and ibuprofen/Tylenol as needed  -Will initiate Paxlovid therapy  -CDC recommends 5-day isolation from time of symptom onset.  With current work schedule will be cleared to return back to work as of 1/15/2024 if no significant upper respiratory symptoms or persisting fever

## 2024-01-10 NOTE — LETTER
January 10, 2024     Patient: Meaghan Gutierrez   YOB: 1985   Date of Visit: 1/10/2024       To Whom It May Concern:    Meaghan Gutierrez was seen in my clinic via telemedicine encounter on 1/10/2024 at 8:45 am.      Secondary to acute illness consistent with COVID-19, isolation protocols have been initiated with tentative return to work date set for 1/15/2024.    If you have any questions or concerns, please don't hesitate to call.         Sincerely,         Matias Salinas, DO

## 2024-01-10 NOTE — PROGRESS NOTES
Outpatient Visit Note    Chief Complaint   Patient presents with    Headache     Sx started Sunday. Tried tylenol sinus with some relief for HA    Fatigue    Nasal Congestion     Started this morning       With patient's permission, this is a Telemedicine visit with video and audio. The provider and patient participated in this telemedicine encounter.    HPI:  Meaghan Gutierrez is a 38 y.o. patient who presents to the office via telemedicine encounter secondary to complaints of headache/fatigue.  Patient was last seen in the office on 6/7/2023 for annual well exam.    They report approximately 3 days of fatigue, nausea and recent development of myalgias and upper respiratory congestion following close contact with confirmed COVID-19.          Current Medications  Current Outpatient Medications   Medication Instructions    Adderall XR 30 mg 24 hr capsule 30 mg, oral, Every morning    amphetamine-dextroamphetamine (Adderall) 5 mg tablet 5 mg, Take 1 Tablet by mouth once daily Every Friday , Saturday and Sunday    cloNIDine (CATAPRES) 0.2 mg, oral, 3 times daily    montelukast (SINGULAIR) 10 mg, oral, Daily    nirmatrelvir-ritonavir (Paxlovid) 300 mg (150 mg x 2)-100 mg tablet therapy pack 3 tablets, oral, 2 times daily, Follow the instructions on the package    ondansetron (ZOFRAN) 4 mg, oral, Every 8 hours PRN    Vraylar 6 mg, oral, Daily        Allergies  Allergies   Allergen Reactions    Bee Venom Protein (Honey Bee) Anaphylaxis    Latex Anaphylaxis and Rash     blisters    Penicillins Hives, Rash, Unknown and Nausea/vomiting     Other reaction(s): Unknown Reaction    Red Dye Rash    Adhesive Tape-Silicones Other     Blisters    Lidocaine Nausea/vomiting    Nickel Swelling     Pt reports  wearing silver metal jewelry causes swelling    Prednisone Unknown    Procaine Unknown and Nausea/vomiting        Past Medical History:   Diagnosis Date    Anxiety disorder, unspecified 08/22/2014    Anxiety    Personal  history of other mental and behavioral disorders 2014    History of depression    Personal history of other specified conditions 2014    History of headache      Past Surgical History:   Procedure Laterality Date    ANTERIOR CRUCIATE LIGAMENT REPAIR Left     cadaver    BLADDER SURGERY  2014    Bladder Surgery     SECTION, CLASSIC  2014     Section    TONSILLECTOMY  2014    Tonsillectomy     No family history on file.  Social History     Tobacco Use    Smoking status: Never    Smokeless tobacco: Never   Vaping Use    Vaping Use: Never used   Substance Use Topics    Alcohol use: Not Currently    Drug use: Never     Tobacco Use: Low Risk  (1/10/2024)    Patient History     Smoking Tobacco Use: Never     Smokeless Tobacco Use: Never     Passive Exposure: Not on file        ROS  All pertinent positive symptoms are included in the history of present illness.  All other systems have been reviewed and are negative and noncontributory to this patient's current ailments.    VITAL SIGNS  Patient is unable to provide    PHYSICAL EXAM  GENERAL APPEARANCE:  Alert and oriented x 3, Pleasant and cooperative, No acute distress.   LUNGS:  No conversational dyspnea or cough during encounter.   PSYCH:  appropriate mood and affect, no difficulty with speech.   Telemedicine visit, no other exam component done.      Assessment/Plan   Problem List Items Addressed This Visit             ICD-10-CM    Close exposure to COVID-19 virus - Primary Z20.822     - With current acute symptoms and close contact exposure for COVID-19, symptoms are highly suspicious for COVID-19 infection and we will treat accordingly as you are unable to access COVID-19 test at this time  -Continue to focus on supportive care including rest, hydration and ibuprofen/Tylenol as needed  -Will initiate Paxlovid therapy  -CDC recommends 5-day isolation from time of symptom onset.  With current work schedule will be cleared to  return back to work as of 1/15/2024 if no significant upper respiratory symptoms or persisting fever         Relevant Medications    nirmatrelvir-ritonavir (Paxlovid) 300 mg (150 mg x 2)-100 mg tablet therapy pack    ondansetron (Zofran) 4 mg tablet       Counseling:       Medication education:         Education:  The patient is counseled regarding potential side-effects of all new medications        Understanding:  Patient expressed understanding        Adherence:  No barriers to adherence identified

## 2024-01-10 NOTE — PATIENT INSTRUCTIONS
Problem List Items Addressed This Visit             ICD-10-CM    Close exposure to COVID-19 virus - Primary Z20.822     - With current acute symptoms and close contact exposure for COVID-19, symptoms are highly suspicious for COVID-19 infection and we will treat accordingly as you are unable to access COVID-19 test at this time  -Continue to focus on supportive care including rest, hydration and ibuprofen/Tylenol as needed  -Will initiate Paxlovid therapy  -CDC recommends 5-day isolation from time of symptom onset.  With current work schedule will be cleared to return back to work as of 1/15/2024 if no significant upper respiratory symptoms or persisting fever         Relevant Medications    nirmatrelvir-ritonavir (Paxlovid) 300 mg (150 mg x 2)-100 mg tablet therapy pack    ondansetron (Zofran) 4 mg tablet       Counseling:       Medication education:         Education:  The patient is counseled regarding potential side-effects of all new medications        Understanding:  Patient expressed understanding        Adherence:  No barriers to adherence identified

## 2024-03-18 ENCOUNTER — APPOINTMENT (OUTPATIENT)
Dept: PRIMARY CARE | Facility: CLINIC | Age: 39
End: 2024-03-18
Payer: COMMERCIAL

## 2024-03-20 ENCOUNTER — TELEMEDICINE (OUTPATIENT)
Dept: PRIMARY CARE | Facility: CLINIC | Age: 39
End: 2024-03-20
Payer: COMMERCIAL

## 2024-03-20 DIAGNOSIS — R59.0 CERVICAL LYMPHADENOPATHY: Primary | ICD-10-CM

## 2024-03-20 DIAGNOSIS — E55.9 VITAMIN D DEFICIENCY: ICD-10-CM

## 2024-03-20 DIAGNOSIS — R53.83 FATIGUE, UNSPECIFIED TYPE: ICD-10-CM

## 2024-03-20 PROCEDURE — 1036F TOBACCO NON-USER: CPT | Performed by: FAMILY MEDICINE

## 2024-03-20 PROCEDURE — 99214 OFFICE O/P EST MOD 30 MIN: CPT | Performed by: FAMILY MEDICINE

## 2024-03-20 RX ORDER — DOXYCYCLINE 100 MG/1
100 CAPSULE ORAL 2 TIMES DAILY
Qty: 20 CAPSULE | Refills: 0 | Status: SHIPPED | OUTPATIENT
Start: 2024-03-20 | End: 2024-03-30

## 2024-03-20 RX ORDER — LAMOTRIGINE 25 MG/1
50 TABLET ORAL
COMMUNITY
Start: 2024-02-23

## 2024-03-20 RX ORDER — SERTRALINE HYDROCHLORIDE 50 MG/1
50 TABLET, FILM COATED ORAL
COMMUNITY
Start: 2022-03-14

## 2024-03-20 RX ORDER — GABAPENTIN 400 MG/1
400 CAPSULE ORAL 3 TIMES DAILY
COMMUNITY
Start: 2024-02-23

## 2024-03-20 ASSESSMENT — PATIENT HEALTH QUESTIONNAIRE - PHQ9
SUM OF ALL RESPONSES TO PHQ9 QUESTIONS 1 AND 2: 0
1. LITTLE INTEREST OR PLEASURE IN DOING THINGS: NOT AT ALL
2. FEELING DOWN, DEPRESSED OR HOPELESS: NOT AT ALL

## 2024-03-20 ASSESSMENT — ENCOUNTER SYMPTOMS
NECK PAIN: 1
SWOLLEN GLANDS: 1
SORE THROAT: 1
TROUBLE SWALLOWING: 1
HEADACHES: 1

## 2024-03-20 ASSESSMENT — PAIN SCALES - GENERAL: PAINLEVEL: 7

## 2024-03-20 NOTE — ASSESSMENT & PLAN NOTE
- With your ongoing symptoms and prominent/tender lymph nodes, will plan for course of doxycycline to take with food as directed  -Will assess blood work for any underlying deficiencies or contributing abnormalities  -Continue to focus on supportive care including good hydration, rest and ibuprofen/Tylenol as needed for pain  -If symptoms persist, can coordinate formal consultation with ENT

## 2024-03-20 NOTE — PATIENT INSTRUCTIONS
Problem List Items Addressed This Visit             ICD-10-CM    Fatigue R53.83    Relevant Orders    CBC    Comprehensive metabolic panel    Vitamin D 25-Hydroxy,Total (for eval of Vitamin D levels)    Vitamin D deficiency E55.9    Relevant Orders    Vitamin D 25-Hydroxy,Total (for eval of Vitamin D levels)    Cervical lymphadenopathy - Primary R59.0     - With your ongoing symptoms and prominent/tender lymph nodes, will plan for course of doxycycline to take with food as directed  -Will assess blood work for any underlying deficiencies or contributing abnormalities  -Continue to focus on supportive care including good hydration, rest and ibuprofen/Tylenol as needed for pain  -If symptoms persist, can coordinate formal consultation with ENT         Relevant Medications    doxycycline (Vibramycin) 100 mg capsule    Other Relevant Orders    CBC       Counseling:       Medication education:         Education:  The patient is counseled regarding potential side-effects of all new medications        Understanding:  Patient expressed understanding        Adherence:  No barriers to adherence identified

## 2024-03-20 NOTE — PROGRESS NOTES
Outpatient Visit Note    Chief Complaint   Patient presents with    Fatigue     Sx started 4 weeks ago. Went to  on 3/9/24 and today. Was advised to see PCP and to get blood work to check levels metabolic panel, white blood cells.    Sore Throat     Swollen glands    Headache    Earache       With patient's permission, this is a Telemedicine visit with video and audio. The provider and patient participated in this telemedicine encounter.    HPI:  Meaghan Gutierrez is a 38 y.o. female who presents to the office via telemedicine encounter secondary to complaints of acute illness/fatigue.    Patient had been seen in the emergency department on 1/11/2024 secondary to complaints of headaches and fatigue.  Stated this had been going on for a week and a half.  Noted nausea and associated headaches, as well as body aches.  No reported lightheadedness, dizziness, vision changes, weakness, numbness or tingling.  COVID-19, flu and RSV testing were completed and normal.  CBC, TSH and CMP showed no significant abnormalities.  Secondary to symptoms a history of migraines, CT of brain was performed with no acute abnormalities.  She was ultimately given IV fluids along with Compazine and Toradol with slight improvement of headache.  Symptoms were ultimately believed to be related to viral syndrome for which ongoing supportive care was recommended.    Patient presented to the office stating that she has not been feeling well.  Stated to recently been on course of antibiotic for years.  Notes that her lymph nodes are painful and swollen though her throat does not hurt.  Was tested for mono and strep, to which both were negative.  Did have HSV test which was positive.  Has been seen by OB/GYN.  Overall, for the last week she has not felt herself, reporting significant fatigue.    States you have separately gone to urgent care twice in the last month including evaluation earlier today.  Was placed initially on Z-Jamil  secondary to fluid in ears.  Has subsequently developed prominent lymph nodes/glands fullness in the neck with discomfort though no active sore throat.  Fatigue continues to be prominent.  Does have frequent episodes of chills/sweats.  Has been attempting to manage with ibuprofen which offers limited symptom improvement.  No reports of nausea, vomiting, diarrhea or urinary complaints.              Current Medications  Current Outpatient Medications   Medication Instructions    Adderall XR 30 mg 24 hr capsule 30 mg, oral, Every morning    amphetamine-dextroamphetamine (Adderall) 5 mg tablet 5 mg, Take 1 Tablet by mouth once daily Every Friday , Saturday and Sunday    cloNIDine (CATAPRES) 0.2 mg, oral, 3 times daily    doxycycline (VIBRAMYCIN) 100 mg, oral, 2 times daily, Take with at least 8 ounces (large glass) of water, do not lie down for 30 minutes after    gabapentin (NEURONTIN) 400 mg, oral, 3 times daily    lamoTRIgine (LAMICTAL) 50 mg, oral, Daily RT    montelukast (SINGULAIR) 10 mg, oral, Daily    sertraline (ZOLOFT) 50 mg, oral, Daily RT    Vraylar 6 mg, oral, Daily        Allergies  Allergies   Allergen Reactions    Bee Venom Protein (Honey Bee) Anaphylaxis    Latex Anaphylaxis and Rash     blisters    Penicillins Hives, Rash, Unknown and Nausea/vomiting     Other reaction(s): Unknown Reaction    Red Dye Rash    Adhesive Tape-Silicones Other     Blisters    Lidocaine Nausea/vomiting    Nickel Swelling     Pt reports  wearing silver metal jewelry causes swelling    Prednisone Unknown    Procaine Unknown and Nausea/vomiting        Past Medical History:   Diagnosis Date    Anxiety disorder, unspecified 08/22/2014    Anxiety    Personal history of other mental and behavioral disorders 08/22/2014    History of depression    Personal history of other specified conditions 08/22/2014    History of headache      Past Surgical History:   Procedure Laterality Date    ANTERIOR CRUCIATE LIGAMENT REPAIR Left     cadaver     BLADDER SURGERY  2014    Bladder Surgery     SECTION, CLASSIC  2014     Section    TONSILLECTOMY  2014    Tonsillectomy     No family history on file.  Social History     Tobacco Use    Smoking status: Never    Smokeless tobacco: Never   Vaping Use    Vaping Use: Never used   Substance Use Topics    Alcohol use: Not Currently    Drug use: Never     Tobacco Use: Low Risk  (3/20/2024)    Patient History     Smoking Tobacco Use: Never     Smokeless Tobacco Use: Never     Passive Exposure: Not on file        ROS  All pertinent positive symptoms are included in the history of present illness.  All other systems have been reviewed and are negative and noncontributory to this patient's current ailments.    VITAL SIGNS  Patient is unable to provide    PHYSICAL EXAM  GENERAL APPEARANCE:  Alert and oriented x 3, Pleasant and cooperative, No acute distress.   LUNGS:  No conversational dyspnea or cough during encounter.   PSYCH:  appropriate mood and affect, no difficulty with speech.   Telemedicine visit, no other exam component done.      Assessment/Plan   Problem List Items Addressed This Visit             ICD-10-CM    Fatigue R53.83    Relevant Orders    CBC    Comprehensive metabolic panel    Vitamin D 25-Hydroxy,Total (for eval of Vitamin D levels)    Vitamin D deficiency E55.9    Relevant Orders    Vitamin D 25-Hydroxy,Total (for eval of Vitamin D levels)    Cervical lymphadenopathy - Primary R59.0     - With your ongoing symptoms and prominent/tender lymph nodes, will plan for course of doxycycline to take with food as directed  -Will assess blood work for any underlying deficiencies or contributing abnormalities  -Continue to focus on supportive care including good hydration, rest and ibuprofen/Tylenol as needed for pain  -If symptoms persist, can coordinate formal consultation with ENT         Relevant Medications    doxycycline (Vibramycin) 100 mg capsule    Other Relevant Orders     CBC       Counseling:       Medication education:         Education:  The patient is counseled regarding potential side-effects of all new medications        Understanding:  Patient expressed understanding        Adherence:  No barriers to adherence identified

## 2024-03-22 ENCOUNTER — APPOINTMENT (OUTPATIENT)
Dept: PRIMARY CARE | Facility: CLINIC | Age: 39
End: 2024-03-22
Payer: COMMERCIAL

## 2024-04-11 ENCOUNTER — TELEPHONE (OUTPATIENT)
Dept: PRIMARY CARE | Facility: CLINIC | Age: 39
End: 2024-04-11

## 2024-04-11 ENCOUNTER — LAB (OUTPATIENT)
Dept: LAB | Facility: LAB | Age: 39
End: 2024-04-11
Payer: COMMERCIAL

## 2024-04-11 ENCOUNTER — OFFICE VISIT (OUTPATIENT)
Dept: PRIMARY CARE | Facility: CLINIC | Age: 39
End: 2024-04-11
Payer: COMMERCIAL

## 2024-04-11 VITALS
TEMPERATURE: 96.5 F | HEART RATE: 87 BPM | OXYGEN SATURATION: 98 % | SYSTOLIC BLOOD PRESSURE: 130 MMHG | HEIGHT: 66 IN | BODY MASS INDEX: 42.59 KG/M2 | WEIGHT: 265 LBS | DIASTOLIC BLOOD PRESSURE: 76 MMHG

## 2024-04-11 DIAGNOSIS — R73.03 PREDIABETES: Primary | ICD-10-CM

## 2024-04-11 DIAGNOSIS — R53.83 FATIGUE, UNSPECIFIED TYPE: ICD-10-CM

## 2024-04-11 DIAGNOSIS — R73.03 PREDIABETES: ICD-10-CM

## 2024-04-11 DIAGNOSIS — E55.9 VITAMIN D DEFICIENCY: ICD-10-CM

## 2024-04-11 DIAGNOSIS — R59.0 CERVICAL LYMPHADENOPATHY: ICD-10-CM

## 2024-04-11 DIAGNOSIS — J30.2 SEASONAL ALLERGIES: ICD-10-CM

## 2024-04-11 PROBLEM — Z20.822 CLOSE EXPOSURE TO COVID-19 VIRUS: Status: RESOLVED | Noted: 2024-01-10 | Resolved: 2024-04-11

## 2024-04-11 LAB
25(OH)D3 SERPL-MCNC: 26 NG/ML (ref 31–100)
ALBUMIN SERPL-MCNC: 4.2 G/DL (ref 3.5–5)
ALP BLD-CCNC: 103 U/L (ref 35–125)
ALT SERPL-CCNC: 38 U/L (ref 5–40)
ANION GAP SERPL CALC-SCNC: 12 MMOL/L
AST SERPL-CCNC: 33 U/L (ref 5–40)
BILIRUB SERPL-MCNC: <0.2 MG/DL (ref 0.1–1.2)
BUN SERPL-MCNC: 11 MG/DL (ref 8–25)
CALCIUM SERPL-MCNC: 9.4 MG/DL (ref 8.5–10.4)
CHLORIDE SERPL-SCNC: 102 MMOL/L (ref 97–107)
CO2 SERPL-SCNC: 25 MMOL/L (ref 24–31)
CREAT SERPL-MCNC: 0.9 MG/DL (ref 0.4–1.6)
EGFRCR SERPLBLD CKD-EPI 2021: 84 ML/MIN/1.73M*2
ERYTHROCYTE [DISTWIDTH] IN BLOOD BY AUTOMATED COUNT: 15.2 % (ref 11.5–14.5)
EST. AVERAGE GLUCOSE BLD GHB EST-MCNC: 137 MG/DL
GLUCOSE SERPL-MCNC: 114 MG/DL (ref 65–99)
HBA1C MFR BLD: 6.4 %
HCT VFR BLD AUTO: 39 % (ref 36–46)
HGB BLD-MCNC: 12.2 G/DL (ref 12–16)
MCH RBC QN AUTO: 25.3 PG (ref 26–34)
MCHC RBC AUTO-ENTMCNC: 31.3 G/DL (ref 32–36)
MCV RBC AUTO: 81 FL (ref 80–100)
NRBC BLD-RTO: 0 /100 WBCS (ref 0–0)
PLATELET # BLD AUTO: 385 X10*3/UL (ref 150–450)
POTASSIUM SERPL-SCNC: 4.3 MMOL/L (ref 3.4–5.1)
PROT SERPL-MCNC: 7.3 G/DL (ref 5.9–7.9)
RBC # BLD AUTO: 4.83 X10*6/UL (ref 4–5.2)
SODIUM SERPL-SCNC: 139 MMOL/L (ref 133–145)
WBC # BLD AUTO: 7.4 X10*3/UL (ref 4.4–11.3)

## 2024-04-11 PROCEDURE — 36415 COLL VENOUS BLD VENIPUNCTURE: CPT

## 2024-04-11 PROCEDURE — 83036 HEMOGLOBIN GLYCOSYLATED A1C: CPT

## 2024-04-11 PROCEDURE — 99214 OFFICE O/P EST MOD 30 MIN: CPT | Performed by: FAMILY MEDICINE

## 2024-04-11 PROCEDURE — 85027 COMPLETE CBC AUTOMATED: CPT

## 2024-04-11 PROCEDURE — 80053 COMPREHEN METABOLIC PANEL: CPT

## 2024-04-11 PROCEDURE — 82306 VITAMIN D 25 HYDROXY: CPT

## 2024-04-11 RX ORDER — CETIRIZINE HYDROCHLORIDE 10 MG/1
10 TABLET ORAL DAILY
Qty: 90 TABLET | Refills: 3 | Status: SHIPPED | OUTPATIENT
Start: 2024-04-11 | End: 2025-04-11

## 2024-04-11 ASSESSMENT — PATIENT HEALTH QUESTIONNAIRE - PHQ9
SUM OF ALL RESPONSES TO PHQ9 QUESTIONS 1 AND 2: 1
2. FEELING DOWN, DEPRESSED OR HOPELESS: NOT AT ALL
1. LITTLE INTEREST OR PLEASURE IN DOING THINGS: SEVERAL DAYS

## 2024-04-11 ASSESSMENT — PAIN SCALES - GENERAL: PAINLEVEL: 5

## 2024-04-11 NOTE — PROGRESS NOTES
Outpatient Visit Note    Chief Complaint   Patient presents with    Blood Sugar Problem     Discuss sugar levels. Pt sugars have been running moderately high         HPI:  Meaghan Gutierrez is a 38 y.o. female who presents to the office secondary to concerns regarding normal blood glucose levels.  Patient was last seen in the office on 3/20/2024 via telemedicine encounter secondary to complaints of acute illness/fatigue.    At time of last encounter, patient had been seen in the emergency department on 1/11/2024 secondary to complaints of headaches and fatigue.  Stated this had been going on for a week and a half.  Noted nausea and associated headaches, as well as body aches.  No reported lightheadedness, dizziness, vision changes, weakness, numbness or tingling.  COVID-19, flu and RSV testing were completed and normal.  CBC, TSH and CMP showed no significant abnormalities.  Secondary to symptoms a history of migraines, CT of brain was performed with no acute abnormalities.  She was ultimately given IV fluids along with Compazine and Toradol with slight improvement of headache.  Symptoms were ultimately believed to be related to viral syndrome for which ongoing supportive care was recommended.    Patient presented to the office stating that they had not been feeling well.  Noted that lymph nodes were painful and swollen though her throat does not hurt.  Was tested for mono and strep, to which both were negative.  Did have HSV test which was positive.  Has been seen by OB/GYN.  Overall, for the last week she has not felt herself, reporting significant fatigue.    Had gone to urgent care twice in the last month including evaluation earlier that same day of telemedicine encounter.  Was placed initially on Z-Jamil secondary to fluid in ears.  Had subsequently developed prominent lymph nodes/glands fullness in the neck with discomfort though no active sore throat.  Fatigue continues to be prominent.  Does have  frequent episodes of chills/sweats.  Had been attempting to manage with ibuprofen which offers limited symptom improvement.  No reports of nausea, vomiting, diarrhea or urinary complaints.  Was ultimately started on course of doxycycline.  Blood work was additionally recommended including CBC, CMP and vitamin D level which have yet to be completed to date.    Of note, patient does have a past medical history significant for prediabetes with last A1c sugar average completed in June 2023 which was 6.0%.  They report concerns that sugars have been running high recently, reporting progressive fatigue.  Denies any overt polyuria, polydipsia or polyphagia.  No reported acute vision/sensation changes.    She additionally notes concerns regarding ears, stating to have been evaluated at urgent care recently.  Does admit to fullness without overt pain, hearing changes or disequilibrium.  Continues to have prominent allergies to which she is requesting prescription refill of Zyrtec as insurance has traditionally had issues filling with Allegra.        Current Medications  Current Outpatient Medications   Medication Instructions    Adderall XR 30 mg 24 hr capsule 30 mg, oral, Every morning    amphetamine-dextroamphetamine (Adderall) 5 mg tablet 5 mg, Take 1 Tablet by mouth once daily Every Friday , Saturday and Sunday    cetirizine (ZYRTEC) 10 mg, oral, Daily    cloNIDine (CATAPRES) 0.2 mg, oral, 3 times daily    fexofenadine HCl (ALLEGRA ALLERGY ORAL) Allegra    gabapentin (NEURONTIN) 400 mg, oral, 3 times daily    lamoTRIgine (LAMICTAL) 50 mg, oral, Daily RT    montelukast (SINGULAIR) 10 mg, oral, Daily    sertraline (ZOLOFT) 50 mg, oral, Daily RT    Vraylar 6 mg, oral, Daily        Allergies  Allergies   Allergen Reactions    Bee Venom Protein (Honey Bee) Anaphylaxis    Latex Anaphylaxis and Rash     blisters    Penicillins Hives, Rash, Unknown and Nausea/vomiting     Other reaction(s): Unknown Reaction    Red Dye Rash     Adhesive Tape-Silicones Other     Blisters    Lidocaine Nausea/vomiting    Nickel Swelling     Pt reports  wearing silver metal jewelry causes swelling    Prednisone Unknown    Procaine Unknown and Nausea/vomiting        Past Medical History:   Diagnosis Date    Anxiety disorder, unspecified 2014    Anxiety    Personal history of other mental and behavioral disorders 2014    History of depression    Personal history of other specified conditions 2014    History of headache      Past Surgical History:   Procedure Laterality Date    ANTERIOR CRUCIATE LIGAMENT REPAIR Left     cadaver    BLADDER SURGERY  2014    Bladder Surgery     SECTION, CLASSIC  2014     Section    TONSILLECTOMY  2014    Tonsillectomy     No family history on file.  Social History     Tobacco Use    Smoking status: Former     Types: Cigarettes    Smokeless tobacco: Never   Vaping Use    Vaping status: Never Used   Substance Use Topics    Alcohol use: Never    Drug use: Never       ROS  All pertinent positive symptoms are included in the history of present illness.  All other systems have been reviewed and are negative and noncontributory to this patient's current ailments.    VITAL SIGNS  Vitals:    24 0814   BP: 130/76   Pulse: 87   Temp: 35.8 °C (96.5 °F)   SpO2: 98%       PHYSICAL EXAM  GENERAL APPEARANCE: alert and oriented, Pleasant and cooperative, No Acute Distress  HEENT: EOMI, PERRLA, tympanic membranes clear and flat bilaterally, nose clear, Oropharynx clear with MMM.   HEART: RRR, normal S1S2, no murmurs, click or rubs  LUNGS: clear to auscultation bilaterally, no wheezes/rhonchi/rales  EXTREMITIES: no edema, normal ROM  SKIN: normal, no rash, unremarkable  NEUROLOGIC EXAM: non-focal exam  MUSCULOSKELETAL: no gross abnormalities  PSYCH: affect is normal, eye contact is good    Assessment/Plan   Problem List Items Addressed This Visit             ICD-10-CM    Seasonal allergies  J30.2     - Antihistamine prescription refill sent per request         Relevant Medications    cetirizine (ZyrTEC) 10 mg tablet    Prediabetes - Primary R73.03     - Will check A1c sugar average secondary to history of prediabetes         Relevant Orders    Hemoglobin A1c (Completed)       Counseling:       Medication education:         Education:  The patient is counseled regarding potential side-effects of all new medications        Understanding:  Patient expressed understanding        Adherence:  No barriers to adherence identified

## 2024-04-11 NOTE — TELEPHONE ENCOUNTER
Pt advised that once provider reviews her lab results she will get a call. Pt states that she might not answer to which I advised that we can forward provider msg to pt via my chart.

## 2024-04-12 ENCOUNTER — PATIENT MESSAGE (OUTPATIENT)
Dept: PRIMARY CARE | Facility: CLINIC | Age: 39
End: 2024-04-12
Payer: COMMERCIAL

## 2024-04-12 DIAGNOSIS — E55.9 VITAMIN D DEFICIENCY: Primary | ICD-10-CM

## 2024-04-12 DIAGNOSIS — R73.03 PREDIABETES: Primary | ICD-10-CM

## 2024-04-12 RX ORDER — ASPIRIN 325 MG
50000 TABLET, DELAYED RELEASE (ENTERIC COATED) ORAL
Qty: 12 CAPSULE | Refills: 0 | Status: SHIPPED | OUTPATIENT
Start: 2024-04-14 | End: 2024-07-13

## 2024-04-15 RX ORDER — DULAGLUTIDE 0.75 MG/.5ML
0.75 INJECTION, SOLUTION SUBCUTANEOUS
Qty: 2 ML | Refills: 2 | Status: SHIPPED | OUTPATIENT
Start: 2024-04-21

## 2024-06-01 ENCOUNTER — PREP FOR PROCEDURE (OUTPATIENT)
Dept: OBSTETRICS AND GYNECOLOGY | Facility: HOSPITAL | Age: 39
End: 2024-06-01
Payer: COMMERCIAL

## 2024-06-01 DIAGNOSIS — Z01.818 PREOPERATIVE TESTING: Primary | ICD-10-CM

## 2024-06-01 RX ORDER — CEFAZOLIN SODIUM 2 G/100ML
2 INJECTION, SOLUTION INTRAVENOUS ONCE
OUTPATIENT
Start: 2024-06-01 | End: 2024-06-01

## 2024-06-01 RX ORDER — SODIUM CHLORIDE 9 MG/ML
100 INJECTION, SOLUTION INTRAVENOUS CONTINUOUS
OUTPATIENT
Start: 2024-06-01

## 2024-06-01 RX ORDER — GABAPENTIN 600 MG/1
600 TABLET ORAL ONCE
OUTPATIENT
Start: 2024-06-01 | End: 2024-06-01

## 2024-06-01 RX ORDER — ACETAMINOPHEN 325 MG/1
975 TABLET ORAL ONCE
OUTPATIENT
Start: 2024-06-01 | End: 2024-06-01

## 2024-06-04 DIAGNOSIS — J30.2 OTHER SEASONAL ALLERGIC RHINITIS: ICD-10-CM

## 2024-06-04 DIAGNOSIS — J45.909 REACTIVE AIRWAY DISEASE WITHOUT COMPLICATION, UNSPECIFIED ASTHMA SEVERITY, UNSPECIFIED WHETHER PERSISTENT (HHS-HCC): ICD-10-CM

## 2024-06-06 RX ORDER — MONTELUKAST SODIUM 10 MG/1
10 TABLET ORAL DAILY
Qty: 90 TABLET | Refills: 0 | Status: SHIPPED | OUTPATIENT
Start: 2024-06-06

## 2024-07-16 ENCOUNTER — LAB (OUTPATIENT)
Dept: LAB | Facility: LAB | Age: 39
End: 2024-07-16
Payer: COMMERCIAL

## 2024-07-16 ENCOUNTER — PRE-ADMISSION TESTING (OUTPATIENT)
Dept: PREADMISSION TESTING | Facility: HOSPITAL | Age: 39
End: 2024-07-16
Payer: COMMERCIAL

## 2024-07-16 VITALS
HEART RATE: 83 BPM | WEIGHT: 280.43 LBS | TEMPERATURE: 96.3 F | DIASTOLIC BLOOD PRESSURE: 92 MMHG | RESPIRATION RATE: 16 BRPM | BODY MASS INDEX: 45.07 KG/M2 | HEIGHT: 66 IN | SYSTOLIC BLOOD PRESSURE: 137 MMHG | OXYGEN SATURATION: 99 %

## 2024-07-16 DIAGNOSIS — Z01.818 PREOPERATIVE TESTING: ICD-10-CM

## 2024-07-16 DIAGNOSIS — E55.9 VITAMIN D DEFICIENCY: Primary | ICD-10-CM

## 2024-07-16 DIAGNOSIS — E55.9 VITAMIN D DEFICIENCY: ICD-10-CM

## 2024-07-16 DIAGNOSIS — Z01.818 PRE-OP TESTING: Primary | ICD-10-CM

## 2024-07-16 LAB
25(OH)D3 SERPL-MCNC: 53 NG/ML (ref 31–100)
ABO GROUP (TYPE) IN BLOOD: NORMAL
ANION GAP SERPL CALC-SCNC: 14 MMOL/L
ANTIBODY SCREEN: NORMAL
APTT PPP: 27.6 SECONDS (ref 22–32.5)
BUN SERPL-MCNC: 13 MG/DL (ref 8–25)
CALCIUM SERPL-MCNC: 9.4 MG/DL (ref 8.5–10.4)
CHLORIDE SERPL-SCNC: 101 MMOL/L (ref 97–107)
CO2 SERPL-SCNC: 23 MMOL/L (ref 24–31)
CREAT SERPL-MCNC: 0.8 MG/DL (ref 0.4–1.6)
EGFRCR SERPLBLD CKD-EPI 2021: >90 ML/MIN/1.73M*2
ERYTHROCYTE [DISTWIDTH] IN BLOOD BY AUTOMATED COUNT: 14.6 % (ref 11.5–14.5)
GLUCOSE SERPL-MCNC: 111 MG/DL (ref 65–99)
HCT VFR BLD AUTO: 38.7 % (ref 36–46)
HGB BLD-MCNC: 12.2 G/DL (ref 12–16)
INR PPP: 1 (ref 0.9–1.2)
MCH RBC QN AUTO: 25.7 PG (ref 26–34)
MCHC RBC AUTO-ENTMCNC: 31.5 G/DL (ref 32–36)
MCV RBC AUTO: 82 FL (ref 80–100)
NRBC BLD-RTO: 0 /100 WBCS (ref 0–0)
PLATELET # BLD AUTO: 372 X10*3/UL (ref 150–450)
POTASSIUM SERPL-SCNC: 4.4 MMOL/L (ref 3.4–5.1)
PROTHROMBIN TIME: 10.1 SECONDS (ref 9.3–12.7)
RBC # BLD AUTO: 4.74 X10*6/UL (ref 4–5.2)
RH FACTOR (ANTIGEN D): NORMAL
SODIUM SERPL-SCNC: 138 MMOL/L (ref 133–145)
WBC # BLD AUTO: 8.7 X10*3/UL (ref 4.4–11.3)

## 2024-07-16 PROCEDURE — 80048 BASIC METABOLIC PNL TOTAL CA: CPT

## 2024-07-16 PROCEDURE — 85610 PROTHROMBIN TIME: CPT

## 2024-07-16 PROCEDURE — 87081 CULTURE SCREEN ONLY: CPT | Mod: WESLAB

## 2024-07-16 PROCEDURE — 93010 ELECTROCARDIOGRAM REPORT: CPT | Performed by: INTERNAL MEDICINE

## 2024-07-16 PROCEDURE — 86900 BLOOD TYPING SEROLOGIC ABO: CPT

## 2024-07-16 PROCEDURE — 93005 ELECTROCARDIOGRAM TRACING: CPT

## 2024-07-16 PROCEDURE — 82306 VITAMIN D 25 HYDROXY: CPT

## 2024-07-16 PROCEDURE — 86850 RBC ANTIBODY SCREEN: CPT

## 2024-07-16 PROCEDURE — 85027 COMPLETE CBC AUTOMATED: CPT

## 2024-07-16 PROCEDURE — 85730 THROMBOPLASTIN TIME PARTIAL: CPT

## 2024-07-16 PROCEDURE — 36415 COLL VENOUS BLD VENIPUNCTURE: CPT

## 2024-07-16 PROCEDURE — 86901 BLOOD TYPING SEROLOGIC RH(D): CPT

## 2024-07-16 RX ORDER — CHOLECALCIFEROL (VITAMIN D3) 50 MCG
2000 TABLET ORAL DAILY
Qty: 90 TABLET | Refills: 3 | Status: SHIPPED | OUTPATIENT
Start: 2024-07-16 | End: 2025-07-16

## 2024-07-16 RX ORDER — CHLORHEXIDINE GLUCONATE ORAL RINSE 1.2 MG/ML
SOLUTION DENTAL
Qty: 473 ML | Refills: 0 | Status: SHIPPED | OUTPATIENT
Start: 2024-07-16 | End: 2024-07-23

## 2024-07-16 ASSESSMENT — ENCOUNTER SYMPTOMS
EYES NEGATIVE: 1
GASTROINTESTINAL NEGATIVE: 1
ALLERGIC/IMMUNOLOGIC NEGATIVE: 1
MUSCULOSKELETAL NEGATIVE: 1
CARDIOVASCULAR NEGATIVE: 1
ENDOCRINE NEGATIVE: 1
CONSTITUTIONAL NEGATIVE: 1
NEUROLOGICAL NEGATIVE: 1
HEMATOLOGIC/LYMPHATIC NEGATIVE: 1
RESPIRATORY NEGATIVE: 1

## 2024-07-16 ASSESSMENT — DUKE ACTIVITY SCORE INDEX (DASI)
CAN YOU PARTICIPATE IN MODERATE RECREATIONAL ACTIVITIES LIKE GOLF, BOWLING, DANCING, DOUBLES TENNIS OR THROWING A BASEBALL OR FOOTBALL: YES
TOTAL_SCORE: 50.2
CAN YOU TAKE CARE OF YOURSELF (EAT, DRESS, BATHE, OR USE TOILET): YES
CAN YOU WALK A BLOCK OR TWO ON LEVEL GROUND: YES
CAN YOU WALK INDOORS, SUCH AS AROUND YOUR HOUSE: YES
CAN YOU DO MODERATE WORK AROUND THE HOUSE LIKE VACUUMING, SWEEPING FLOORS OR CARRYING GROCERIES: YES
CAN YOU RUN A SHORT DISTANCE: YES
CAN YOU DO HEAVY WORK AROUND THE HOUSE LIKE SCRUBBING FLOORS OR LIFTING AND MOVING HEAVY FURNITURE: NO
CAN YOU CLIMB A FLIGHT OF STAIRS OR WALK UP A HILL: YES
DASI METS SCORE: 8.9
CAN YOU DO LIGHT WORK AROUND THE HOUSE LIKE DUSTING OR WASHING DISHES: YES
CAN YOU PARTICIPATE IN STRENOUS SPORTS LIKE SWIMMING, SINGLES TENNIS, FOOTBALL, BASKETBALL, OR SKIING: YES
CAN YOU HAVE SEXUAL RELATIONS: YES
CAN YOU DO YARD WORK LIKE RAKING LEAVES, WEEDING OR PUSHING A MOWER: YES

## 2024-07-16 ASSESSMENT — PAIN SCALES - GENERAL: PAINLEVEL_OUTOF10: 0 - NO PAIN

## 2024-07-16 ASSESSMENT — PAIN - FUNCTIONAL ASSESSMENT: PAIN_FUNCTIONAL_ASSESSMENT: 0-10

## 2024-07-16 NOTE — H&P (VIEW-ONLY)
CPM/PAT Evaluation       Name: Meaghan Gutierrez (Meaghan Gutierrez)  /Age: 1985/39 y.o.           Chief Complaint: Endometriosis     HPI: Meaghan Gutierrez is a 39 year old female with a history Endometriosis. She states has been dealing with painful periods for the past 18 years. She states her menstrual cycle can last 7 to 9 days with painful heavy bleeding with clots. She denies nausea, vomiting, chills, fever, chest pain, palpitations, and SOB. She has been scheduled for a hysterectomy.    Past Medical History:   Diagnosis Date    Anxiety disorder, unspecified 2014    Anxiety    Endometriosis of cervix     Personal history of other mental and behavioral disorders 2014    History of depression    Personal history of other specified conditions 2014    History of headache    Sleep apnea        Past Surgical History:   Procedure Laterality Date    ANTERIOR CRUCIATE LIGAMENT REPAIR Left     cadaver    BLADDER SURGERY  2014    Bladder Surgery     SECTION, CLASSIC  2014     Section    TONSILLECTOMY  2014    Tonsillectomy     Social History     Tobacco Use    Smoking status: Former     Types: Cigarettes    Smokeless tobacco: Never   Substance Use Topics    Alcohol use: Never     Comment: RARELY 2 X YEAR     Social History     Substance and Sexual Activity   Drug Use Never       Patient  reports being sexually active and has had partner(s) who are female.    No family history on file.    Allergies   Allergen Reactions    Bee Venom Protein (Honey Bee) Anaphylaxis    Latex Anaphylaxis and Rash     blisters    Penicillins Hives, Rash, Unknown and Nausea/vomiting     Other reaction(s): Unknown Reaction    Red Dye Rash    Adhesive Tape-Silicones Other     Blisters    Lidocaine Nausea/vomiting    Nickel Swelling     Pt reports  wearing silver metal jewelry causes swelling    Prednisone Unknown    Procaine Unknown and Nausea/vomiting     Current Outpatient  Medications   Medication Sig Dispense Refill    Adderall XR 30 mg 24 hr capsule Take 1 capsule (30 mg) by mouth once daily in the morning.      amphetamine-dextroamphetamine (Adderall) 5 mg tablet 1 tablet (5 mg). Take 1 Tablet by mouth once daily Every Friday , Saturday and Sunday      cetirizine (ZyrTEC) 10 mg tablet Take 1 tablet (10 mg) by mouth once daily. 90 tablet 3    chlorhexidine (Peridex) 0.12 % solution Use as directed 473 mL 0    cloNIDine (Catapres) 0.2 mg tablet Take 1 tablet (0.2 mg) by mouth 3 times a day.      dulaglutide (Trulicity) 0.75 mg/0.5 mL pen injector Inject 0.75 mg under the skin 1 (one) time per week. 2 mL 2    fexofenadine HCl (ALLEGRA ALLERGY ORAL) Allegra      gabapentin (Neurontin) 400 mg capsule Take 1 capsule (400 mg) by mouth 3 times a day.      lamoTRIgine (LaMICtal) 25 mg tablet Take 2 tablets (50 mg) by mouth once daily.      montelukast (Singulair) 10 mg tablet TAKE 1 TABLET BY MOUTH EVERY DAY 90 tablet 0    sertraline (Zoloft) 50 mg tablet Take 1 tablet (50 mg) by mouth once daily.      Vraylar 6 mg capsule Take 1 capsule (6 mg) by mouth once daily.       No current facility-administered medications for this visit.       Review of Systems   Constitutional: Negative.    HENT: Negative.     Eyes: Negative.    Respiratory: Negative.     Cardiovascular: Negative.    Gastrointestinal: Negative.    Endocrine: Negative.    Genitourinary: Negative.    Musculoskeletal: Negative.    Skin: Negative.    Allergic/Immunologic: Negative.    Neurological: Negative.    Hematological: Negative.    Psychiatric/Behavioral:          HX of boarder line personality disorder, premenstrual dysphoric disorder, ADHD, panic disorder, anxiety, insomina and ADHD              Physical Exam  Constitutional:       Appearance: Normal appearance. She is obese.   HENT:      Head: Normocephalic and atraumatic.      Nose: Nose normal.      Mouth/Throat:      Mouth: Mucous membranes are moist.      Pharynx:  "Oropharynx is clear.   Eyes:      Extraocular Movements: Extraocular movements intact.      Conjunctiva/sclera: Conjunctivae normal.      Pupils: Pupils are equal, round, and reactive to light.   Cardiovascular:      Rate and Rhythm: Normal rate and regular rhythm.      Pulses: Normal pulses.      Heart sounds: Normal heart sounds.   Pulmonary:      Effort: Pulmonary effort is normal.      Breath sounds: Normal breath sounds.   Abdominal:      General: Bowel sounds are normal.   Musculoskeletal:         General: Normal range of motion.      Cervical back: Normal range of motion and neck supple.   Skin:     General: Skin is warm and dry.   Neurological:      General: No focal deficit present.      Mental Status: She is alert and oriented to person, place, and time.   Psychiatric:         Mood and Affect: Mood normal.         Behavior: Behavior normal.         Thought Content: Thought content normal.         Judgment: Judgment normal.          PAT AIRWAY:   Airway:     Mallampati::  II    TM distance::  <3 FB    Neck ROM::  Full  normal      BP (!) 137/92   Pulse 83   Temp 35.7 °C (96.3 °F) (Temporal)   Resp 16   Ht 1.676 m (5' 6\")   Wt 127 kg (280 lb 6.8 oz)   LMP 06/01/2024 (Approximate) Comment: DOESNT TRACK CYCLE  SpO2 99%   BMI 45.26 kg/m²     ASA:2  RAQUEL: 1.9%  RCRI: 0.4%      DASI Risk Score      Flowsheet Row Most Recent Value   DASI SCORE 50.2   METS Score (Will be calculated only when all the questions are answered) 8.9          Caprini DVT Assessment    No data to display       Modified Frailty Index    No data to display       CHADS2 Stroke Risk  Current as of 37 minutes ago        N/A 3 to 100%: High Risk   2 to < 3%: Medium Risk   0 to < 2%: Low Risk     Last Change: N/A          This score determines the patient's risk of having a stroke if the patient has atrial fibrillation.        This score is not applicable to this patient. Components are not calculated.          Revised Cardiac Risk Index  "     Flowsheet Row Most Recent Value   Revised Cardiac Risk Calculator 0          Apfel Simplified Score    No data to display       Risk Analysis Index Results This Encounter    No data found in the last 1 encounters.       Stop Bang Score      Flowsheet Row Most Recent Value   Do you snore loudly? 1   Do you often feel tired or fatigued after your sleep? 1   Has anyone ever observed you stop breathing in your sleep? 0   Do you have or are you being treated for high blood pressure? 0   Recent BMI (Calculated) 45.3   Is BMI greater than 35 kg/m2? 1=Yes   Age older than 50 years old? 0=No   Is your neck circumference greater than 17 inches (Male) or 16 inches (Female)? 1   Gender - Male 0=No   STOP-BANG Total Score 4            Assessment and Plan:     Abnormal uterine bleeding :Hysterectomy Transvaginal Robot-Assisted with Salpingo-Oophorectomy   Pre-diabetic: Patient will start Trulicity after hysterectomy. Last Hemoglobin A1C was 6.4 on 4/11/24  LIANA: Patient denies wearing CPAP  Hx of mental and behavioral disorders : Patient follows Psych. Currently on Lamictal and Zoloft  BMI: 45.26    BMP, CBC, Coagulation screen, T&S, MRSA completed in [facility]  EKG performed in [facility]    Khadijah Curry, APRN-CNP

## 2024-07-16 NOTE — CPM/PAT H&P
CPM/PAT Evaluation       Name: Meaghan Gutierrez (Meaghan Gutierrez)  /Age: 1985/39 y.o.           Chief Complaint: Endometriosis     HPI: Meaghan Gutierrez is a 39 year old female with a history Endometriosis. She states has been dealing with painful periods for the past 18 years. She states her menstrual cycle can last 7 to 9 days with painful heavy bleeding with clots. She denies nausea, vomiting, chills, fever, chest pain, palpitations, and SOB. She has been scheduled for a hysterectomy.    Past Medical History:   Diagnosis Date    Anxiety disorder, unspecified 2014    Anxiety    Endometriosis of cervix     Personal history of other mental and behavioral disorders 2014    History of depression    Personal history of other specified conditions 2014    History of headache    Sleep apnea        Past Surgical History:   Procedure Laterality Date    ANTERIOR CRUCIATE LIGAMENT REPAIR Left     cadaver    BLADDER SURGERY  2014    Bladder Surgery     SECTION, CLASSIC  2014     Section    TONSILLECTOMY  2014    Tonsillectomy     Social History     Tobacco Use    Smoking status: Former     Types: Cigarettes    Smokeless tobacco: Never   Substance Use Topics    Alcohol use: Never     Comment: RARELY 2 X YEAR     Social History     Substance and Sexual Activity   Drug Use Never       Patient  reports being sexually active and has had partner(s) who are female.    No family history on file.    Allergies   Allergen Reactions    Bee Venom Protein (Honey Bee) Anaphylaxis    Latex Anaphylaxis and Rash     blisters    Penicillins Hives, Rash, Unknown and Nausea/vomiting     Other reaction(s): Unknown Reaction    Red Dye Rash    Adhesive Tape-Silicones Other     Blisters    Lidocaine Nausea/vomiting    Nickel Swelling     Pt reports  wearing silver metal jewelry causes swelling    Prednisone Unknown    Procaine Unknown and Nausea/vomiting     Current Outpatient  Medications   Medication Sig Dispense Refill    Adderall XR 30 mg 24 hr capsule Take 1 capsule (30 mg) by mouth once daily in the morning.      amphetamine-dextroamphetamine (Adderall) 5 mg tablet 1 tablet (5 mg). Take 1 Tablet by mouth once daily Every Friday , Saturday and Sunday      cetirizine (ZyrTEC) 10 mg tablet Take 1 tablet (10 mg) by mouth once daily. 90 tablet 3    chlorhexidine (Peridex) 0.12 % solution Use as directed 473 mL 0    cloNIDine (Catapres) 0.2 mg tablet Take 1 tablet (0.2 mg) by mouth 3 times a day.      dulaglutide (Trulicity) 0.75 mg/0.5 mL pen injector Inject 0.75 mg under the skin 1 (one) time per week. 2 mL 2    fexofenadine HCl (ALLEGRA ALLERGY ORAL) Allegra      gabapentin (Neurontin) 400 mg capsule Take 1 capsule (400 mg) by mouth 3 times a day.      lamoTRIgine (LaMICtal) 25 mg tablet Take 2 tablets (50 mg) by mouth once daily.      montelukast (Singulair) 10 mg tablet TAKE 1 TABLET BY MOUTH EVERY DAY 90 tablet 0    sertraline (Zoloft) 50 mg tablet Take 1 tablet (50 mg) by mouth once daily.      Vraylar 6 mg capsule Take 1 capsule (6 mg) by mouth once daily.       No current facility-administered medications for this visit.       Review of Systems   Constitutional: Negative.    HENT: Negative.     Eyes: Negative.    Respiratory: Negative.     Cardiovascular: Negative.    Gastrointestinal: Negative.    Endocrine: Negative.    Genitourinary: Negative.    Musculoskeletal: Negative.    Skin: Negative.    Allergic/Immunologic: Negative.    Neurological: Negative.    Hematological: Negative.    Psychiatric/Behavioral:          HX of boarder line personality disorder, premenstrual dysphoric disorder, ADHD, panic disorder, anxiety, insomina and ADHD              Physical Exam  Constitutional:       Appearance: Normal appearance. She is obese.   HENT:      Head: Normocephalic and atraumatic.      Nose: Nose normal.      Mouth/Throat:      Mouth: Mucous membranes are moist.      Pharynx:  "Oropharynx is clear.   Eyes:      Extraocular Movements: Extraocular movements intact.      Conjunctiva/sclera: Conjunctivae normal.      Pupils: Pupils are equal, round, and reactive to light.   Cardiovascular:      Rate and Rhythm: Normal rate and regular rhythm.      Pulses: Normal pulses.      Heart sounds: Normal heart sounds.   Pulmonary:      Effort: Pulmonary effort is normal.      Breath sounds: Normal breath sounds.   Abdominal:      General: Bowel sounds are normal.   Musculoskeletal:         General: Normal range of motion.      Cervical back: Normal range of motion and neck supple.   Skin:     General: Skin is warm and dry.   Neurological:      General: No focal deficit present.      Mental Status: She is alert and oriented to person, place, and time.   Psychiatric:         Mood and Affect: Mood normal.         Behavior: Behavior normal.         Thought Content: Thought content normal.         Judgment: Judgment normal.          PAT AIRWAY:   Airway:     Mallampati::  II    TM distance::  <3 FB    Neck ROM::  Full  normal      BP (!) 137/92   Pulse 83   Temp 35.7 °C (96.3 °F) (Temporal)   Resp 16   Ht 1.676 m (5' 6\")   Wt 127 kg (280 lb 6.8 oz)   LMP 06/01/2024 (Approximate) Comment: DOESNT TRACK CYCLE  SpO2 99%   BMI 45.26 kg/m²     ASA:2  RAQUEL: 1.9%  RCRI: 0.4%      DASI Risk Score      Flowsheet Row Most Recent Value   DASI SCORE 50.2   METS Score (Will be calculated only when all the questions are answered) 8.9          Caprini DVT Assessment    No data to display       Modified Frailty Index    No data to display       CHADS2 Stroke Risk  Current as of 37 minutes ago        N/A 3 to 100%: High Risk   2 to < 3%: Medium Risk   0 to < 2%: Low Risk     Last Change: N/A          This score determines the patient's risk of having a stroke if the patient has atrial fibrillation.        This score is not applicable to this patient. Components are not calculated.          Revised Cardiac Risk Index  "     Flowsheet Row Most Recent Value   Revised Cardiac Risk Calculator 0          Apfel Simplified Score    No data to display       Risk Analysis Index Results This Encounter    No data found in the last 1 encounters.       Stop Bang Score      Flowsheet Row Most Recent Value   Do you snore loudly? 1   Do you often feel tired or fatigued after your sleep? 1   Has anyone ever observed you stop breathing in your sleep? 0   Do you have or are you being treated for high blood pressure? 0   Recent BMI (Calculated) 45.3   Is BMI greater than 35 kg/m2? 1=Yes   Age older than 50 years old? 0=No   Is your neck circumference greater than 17 inches (Male) or 16 inches (Female)? 1   Gender - Male 0=No   STOP-BANG Total Score 4            Assessment and Plan:     Abnormal uterine bleeding :Hysterectomy Transvaginal Robot-Assisted with Salpingo-Oophorectomy   Pre-diabetic: Patient will start Trulicity after hysterectomy. Last Hemoglobin A1C was 6.4 on 4/11/24  LIANA: Patient denies wearing CPAP  Hx of mental and behavioral disorders : Patient follows Psych. Currently on Lamictal and Zoloft  BMI: 45.26    BMP, CBC, Coagulation screen, T&S, MRSA completed in [facility]  EKG performed in [facility]    Khadijah Curry, APRN-CNP

## 2024-07-16 NOTE — PREPROCEDURE INSTRUCTIONS
PAT DISCHARGE INSTRUCTIONS    Please call the Same Day Surgery (SDS) Department of the hospital where your procedure will be performed after 2:00 PM the day before your surgery. If you are scheduled on a Monday, or a Tuesday following a Monday holiday, you will need to call on the last business day prior to your surgery.    [facility]  [address], [address] [phone]  [facility]  [address] [place] [address] [phone]  [facility]  [address] [place] [address]  [phone]    Please let your surgeon know if:      You develop any open sores, shingles, burning or painful urination as these may increase your risk of an infection.   You no longer wish to have the surgery.   Any other personal circumstances change that may lead to the need to cancel or defer this surgery-such as being sick or getting admitted to any hospital within one week of your planned procedure.    Your contact details change, such as a change of address or phone number.    Starting now:     Please DO NOT drink alcohol or smoke for 24 hours before surgery. It is well known that quitting smoking can make a huge difference to your health and recovery from surgery. The longer you abstain from smoking, the better your chances of a healthy recovery. If you need help with quitting, call 1-800-QUIT-NOW to be connected to a trained counselor who will discuss the best methods to help you quit.     Before your surgery:    Please stop all supplements 7 days prior to surgery. Or as directed by your surgeon.   Please stop taking NSAID pain medicine such as Advil and Motrin 7 days before surgery.    If you develop any fever, cough, cold, rashes, cuts, scratches, scrapes, urinary symptoms or infection anywhere on your body (including teeth and gums) prior to surgery, please call your  surgeon’s office as soon as possible. This may require treatment to reduce the chance of cancellation on the day of surgery.    The day before your surgery:   DIET- Please follow the diet instructions at the top of your packet.   Get a good night’s rest.  Use the special soap for bathing if you have been instructed to use one.    Scheduled surgery times may change and you will be notified if this occurs - please check your personal voicemail for any updates.     On the morning of surgery:   Wear comfortable, loose fitting clothes which open in the front. Please do not wear moisturizers, creams, lotions, makeup or perfume.    Please bring with you to surgery:   Photo ID and insurance card   Current list of medicines and allergies   Pacemaker/ Defibrillator/Heart stent cards   CPAP machine and mask    Slings/ splints/ crutches   A copy of your complete advanced directive/DHPOA.    Please do NOT bring with you to surgery:   All jewelry and valuables should be left at home.   Prosthetic devices such as contact lenses, hearing aids, dentures, eyelash extensions, hairpins and body piercings must be removed prior to going in to the surgical suite.    After outpatient surgery:   A responsible adult MUST accompany you at the time of discharge and stay with you for 24 hours after your surgery. You may NOT drive yourself home after surgery.    Do not drive, operate machinery, make critical decisions or do activities that require co-ordination or balance until after a night’s sleep.   Do not drink alcoholic beverages for 24 hours.   Instructions for resuming your medications will be provided by your surgeon.    CALL YOUR DOCTOR AFTER SURGERY IF YOU HAVE:     Chills and/or a fever of 101° F or higher.    Redness, swelling, pus or drainage from your surgical wound or a bad smell from the wound.    Lightheadedness, fainting or confusion.    Persistent vomiting (throwing up) and are not able to eat or drink for 12 hours.    Three  or more loose, watery bowel movements in 24 hours (diarrhea).   Difficulty or pain while urinating( after non-urological surgery)    Pain and swelling in your legs, especially if it is only on one side.    Difficulty breathing or are breathing faster than normal.    Any new concerning symptoms.   Medication List            Accurate as of July 16, 2024  9:40 AM. Always use your most recent med list.                * Adderall XR 30 mg 24 hr capsule  Generic drug: amphetamine-dextroamphetamine XR  Notes to patient: Not taking     * amphetamine-dextroamphetamine 5 mg tablet  Commonly known as: Adderall  Notes to patient: Not taking     ALLEGRA ALLERGY ORAL  Notes to patient: Not taking     cetirizine 10 mg tablet  Commonly known as: ZyrTEC  Take 1 tablet (10 mg) by mouth once daily.  Medication Adjustments for Surgery: Take morning of surgery with sip of water, no other fluids     cloNIDine 0.2 mg tablet  Commonly known as: Catapres  Medication Adjustments for Surgery: Take morning of surgery with sip of water, no other fluids     gabapentin 400 mg capsule  Commonly known as: Neurontin  Medication Adjustments for Surgery: Take morning of surgery with sip of water, no other fluids     lamoTRIgine 25 mg tablet  Commonly known as: LaMICtal  Medication Adjustments for Surgery: Take morning of surgery with sip of water, no other fluids     montelukast 10 mg tablet  Commonly known as: Singulair  TAKE 1 TABLET BY MOUTH EVERY DAY  Medication Adjustments for Surgery: Take morning of surgery with sip of water, no other fluids     sertraline 50 mg tablet  Commonly known as: Zoloft  Notes to patient: Stopped a month ago     Trulicity 0.75 mg/0.5 mL pen injector  Generic drug: dulaglutide  Inject 0.75 mg under the skin 1 (one) time per week.  Medication Adjustments for Surgery: Stop 7 days before surgery     Vraylar 6 mg capsule  Generic drug: cariprazine  Medication Adjustments for Surgery: Take morning of surgery with sip of  water, no other fluids           * This list has 2 medication(s) that are the same as other medications prescribed for you. Read the directions carefully, and ask your doctor or other care provider to review them with you.                                  NPO Instructions:    Do not eat any food after midnight the night before your surgery/procedure.  You may have clear liquids until TWO hours before surgery/procedure. This includes water, black tea/coffee, (no milk or cream) apple juice and electrolyte drinks (Gatorade).  You may chew gum up to TWO hours before your surgery/procedure.    Additional Instructions:     The Day before Surgery:  Review your medication instructions, stop indicated medications  ERAS patients, continue using your incentive spirometry   Do not eat any food after Midnight

## 2024-07-18 LAB
ATRIAL RATE: 75 BPM
P AXIS: 6 DEGREES
P OFFSET: 190 MS
P ONSET: 146 MS
PR INTERVAL: 150 MS
Q ONSET: 221 MS
QRS COUNT: 13 BEATS
QRS DURATION: 102 MS
QT INTERVAL: 382 MS
QTC CALCULATION(BAZETT): 426 MS
QTC FREDERICIA: 411 MS
R AXIS: 8 DEGREES
STAPHYLOCOCCUS SPEC CULT: NORMAL
T AXIS: 12 DEGREES
T OFFSET: 412 MS
VENTRICULAR RATE: 75 BPM

## 2024-07-23 ENCOUNTER — ANESTHESIA EVENT (OUTPATIENT)
Dept: OPERATING ROOM | Facility: HOSPITAL | Age: 39
End: 2024-07-23
Payer: COMMERCIAL

## 2024-07-23 ENCOUNTER — ANESTHESIA (OUTPATIENT)
Dept: OPERATING ROOM | Facility: HOSPITAL | Age: 39
End: 2024-07-23
Payer: COMMERCIAL

## 2024-07-23 ENCOUNTER — APPOINTMENT (OUTPATIENT)
Dept: RADIOLOGY | Facility: HOSPITAL | Age: 39
End: 2024-07-23
Payer: COMMERCIAL

## 2024-07-23 ENCOUNTER — HOSPITAL ENCOUNTER (OUTPATIENT)
Facility: HOSPITAL | Age: 39
Setting detail: OUTPATIENT SURGERY
Discharge: HOME | End: 2024-07-23
Attending: OBSTETRICS & GYNECOLOGY | Admitting: OBSTETRICS & GYNECOLOGY
Payer: COMMERCIAL

## 2024-07-23 ENCOUNTER — HOSPITAL ENCOUNTER (EMERGENCY)
Facility: HOSPITAL | Age: 39
Discharge: HOME | End: 2024-07-24
Attending: STUDENT IN AN ORGANIZED HEALTH CARE EDUCATION/TRAINING PROGRAM
Payer: COMMERCIAL

## 2024-07-23 VITALS
BODY MASS INDEX: 45 KG/M2 | DIASTOLIC BLOOD PRESSURE: 92 MMHG | SYSTOLIC BLOOD PRESSURE: 145 MMHG | OXYGEN SATURATION: 96 % | HEART RATE: 84 BPM | HEIGHT: 66 IN | RESPIRATION RATE: 16 BRPM | WEIGHT: 279.98 LBS | TEMPERATURE: 96.6 F

## 2024-07-23 DIAGNOSIS — R51.9 ACUTE NONINTRACTABLE HEADACHE, UNSPECIFIED HEADACHE TYPE: Primary | ICD-10-CM

## 2024-07-23 DIAGNOSIS — N93.9 ABNORMAL UTERINE BLEEDING: ICD-10-CM

## 2024-07-23 DIAGNOSIS — G89.18 POSTOPERATIVE PAIN: Primary | ICD-10-CM

## 2024-07-23 LAB
ABO GROUP (TYPE) IN BLOOD: NORMAL
GLUCOSE BLD MANUAL STRIP-MCNC: 111 MG/DL (ref 74–99)
PREGNANCY TEST URINE, POC: NEGATIVE
RH FACTOR (ANTIGEN D): NORMAL

## 2024-07-23 PROCEDURE — 2500000005 HC RX 250 GENERAL PHARMACY W/O HCPCS

## 2024-07-23 PROCEDURE — A58571 PR LAPAROSCOPY W TOT HYSTERECTUTERUS <=250 GRAM  W TUBE/OVARY

## 2024-07-23 PROCEDURE — 3700000001 HC GENERAL ANESTHESIA TIME - INITIAL BASE CHARGE: Performed by: OBSTETRICS & GYNECOLOGY

## 2024-07-23 PROCEDURE — 2500000004 HC RX 250 GENERAL PHARMACY W/ HCPCS (ALT 636 FOR OP/ED)

## 2024-07-23 PROCEDURE — 2500000004 HC RX 250 GENERAL PHARMACY W/ HCPCS (ALT 636 FOR OP/ED): Performed by: STUDENT IN AN ORGANIZED HEALTH CARE EDUCATION/TRAINING PROGRAM

## 2024-07-23 PROCEDURE — 70450 CT HEAD/BRAIN W/O DYE: CPT

## 2024-07-23 PROCEDURE — 82947 ASSAY GLUCOSE BLOOD QUANT: CPT

## 2024-07-23 PROCEDURE — 36415 COLL VENOUS BLD VENIPUNCTURE: CPT | Performed by: OBSTETRICS & GYNECOLOGY

## 2024-07-23 PROCEDURE — 99284 EMERGENCY DEPT VISIT MOD MDM: CPT | Mod: 25

## 2024-07-23 PROCEDURE — 81025 URINE PREGNANCY TEST: CPT | Performed by: OBSTETRICS & GYNECOLOGY

## 2024-07-23 PROCEDURE — 96375 TX/PRO/DX INJ NEW DRUG ADDON: CPT | Mod: 59

## 2024-07-23 PROCEDURE — 3600000010 HC OR TIME - EACH INCREMENTAL 1 MINUTE - PROCEDURE LEVEL FIVE: Performed by: OBSTETRICS & GYNECOLOGY

## 2024-07-23 PROCEDURE — 7100000002 HC RECOVERY ROOM TIME - EACH INCREMENTAL 1 MINUTE: Performed by: OBSTETRICS & GYNECOLOGY

## 2024-07-23 PROCEDURE — 3600000005 HC OR TIME - INITIAL BASE CHARGE - PROCEDURE LEVEL FIVE: Performed by: OBSTETRICS & GYNECOLOGY

## 2024-07-23 PROCEDURE — 96374 THER/PROPH/DIAG INJ IV PUSH: CPT | Mod: 59

## 2024-07-23 PROCEDURE — 2720000007 HC OR 272 NO HCPCS: Performed by: OBSTETRICS & GYNECOLOGY

## 2024-07-23 PROCEDURE — 2500000005 HC RX 250 GENERAL PHARMACY W/O HCPCS: Performed by: OBSTETRICS & GYNECOLOGY

## 2024-07-23 PROCEDURE — 7100000001 HC RECOVERY ROOM TIME - INITIAL BASE CHARGE: Performed by: OBSTETRICS & GYNECOLOGY

## 2024-07-23 PROCEDURE — A58571 PR LAPAROSCOPY W TOT HYSTERECTUTERUS <=250 GRAM  W TUBE/OVARY: Performed by: STUDENT IN AN ORGANIZED HEALTH CARE EDUCATION/TRAINING PROGRAM

## 2024-07-23 PROCEDURE — 64999 UNLISTED PX NERVOUS SYSTEM: CPT | Performed by: STUDENT IN AN ORGANIZED HEALTH CARE EDUCATION/TRAINING PROGRAM

## 2024-07-23 PROCEDURE — 88307 TISSUE EXAM BY PATHOLOGIST: CPT | Mod: TC | Performed by: OBSTETRICS & GYNECOLOGY

## 2024-07-23 PROCEDURE — 3700000002 HC GENERAL ANESTHESIA TIME - EACH INCREMENTAL 1 MINUTE: Performed by: OBSTETRICS & GYNECOLOGY

## 2024-07-23 PROCEDURE — 7100000010 HC PHASE TWO TIME - EACH INCREMENTAL 1 MINUTE: Performed by: OBSTETRICS & GYNECOLOGY

## 2024-07-23 PROCEDURE — 70450 CT HEAD/BRAIN W/O DYE: CPT | Performed by: SURGERY

## 2024-07-23 PROCEDURE — 2500000004 HC RX 250 GENERAL PHARMACY W/ HCPCS (ALT 636 FOR OP/ED): Mod: JZ | Performed by: OBSTETRICS & GYNECOLOGY

## 2024-07-23 PROCEDURE — 7100000009 HC PHASE TWO TIME - INITIAL BASE CHARGE: Performed by: OBSTETRICS & GYNECOLOGY

## 2024-07-23 RX ORDER — GABAPENTIN 600 MG/1
600 TABLET ORAL ONCE
Status: DISCONTINUED | OUTPATIENT
Start: 2024-07-23 | End: 2024-07-23 | Stop reason: HOSPADM

## 2024-07-23 RX ORDER — OXYCODONE HYDROCHLORIDE 5 MG/1
10 TABLET ORAL EVERY 4 HOURS PRN
Status: DISCONTINUED | OUTPATIENT
Start: 2024-07-23 | End: 2024-07-23 | Stop reason: HOSPADM

## 2024-07-23 RX ORDER — LIDOCAINE HYDROCHLORIDE 10 MG/ML
INJECTION INFILTRATION; PERINEURAL AS NEEDED
Status: DISCONTINUED | OUTPATIENT
Start: 2024-07-23 | End: 2024-07-23

## 2024-07-23 RX ORDER — SODIUM CHLORIDE 9 MG/ML
100 INJECTION, SOLUTION INTRAVENOUS CONTINUOUS
Status: DISCONTINUED | OUTPATIENT
Start: 2024-07-23 | End: 2024-07-23 | Stop reason: HOSPADM

## 2024-07-23 RX ORDER — DEXMEDETOMIDINE HYDROCHLORIDE 100 UG/ML
INJECTION, SOLUTION INTRAVENOUS AS NEEDED
Status: DISCONTINUED | OUTPATIENT
Start: 2024-07-23 | End: 2024-07-23

## 2024-07-23 RX ORDER — CEFAZOLIN 1 G/1
INJECTION, POWDER, FOR SOLUTION INTRAVENOUS AS NEEDED
Status: DISCONTINUED | OUTPATIENT
Start: 2024-07-23 | End: 2024-07-23

## 2024-07-23 RX ORDER — PROCHLORPERAZINE EDISYLATE 5 MG/ML
10 INJECTION INTRAMUSCULAR; INTRAVENOUS ONCE
Status: COMPLETED | OUTPATIENT
Start: 2024-07-23 | End: 2024-07-23

## 2024-07-23 RX ORDER — ONDANSETRON HYDROCHLORIDE 2 MG/ML
INJECTION, SOLUTION INTRAVENOUS AS NEEDED
Status: DISCONTINUED | OUTPATIENT
Start: 2024-07-23 | End: 2024-07-23

## 2024-07-23 RX ORDER — ONDANSETRON 4 MG/1
4 TABLET, FILM COATED ORAL EVERY 6 HOURS PRN
Qty: 30 TABLET | Refills: 1 | Status: SHIPPED | OUTPATIENT
Start: 2024-07-23 | End: 2024-08-22

## 2024-07-23 RX ORDER — KETOROLAC TROMETHAMINE 30 MG/ML
15 INJECTION, SOLUTION INTRAMUSCULAR; INTRAVENOUS ONCE
Status: COMPLETED | OUTPATIENT
Start: 2024-07-23 | End: 2024-07-23

## 2024-07-23 RX ORDER — ACETAMINOPHEN 325 MG/1
650 TABLET ORAL EVERY 6 HOURS PRN
Qty: 30 TABLET | Refills: 1 | Status: SHIPPED | OUTPATIENT
Start: 2024-07-23

## 2024-07-23 RX ORDER — OXYCODONE HYDROCHLORIDE 5 MG/1
5 TABLET ORAL EVERY 4 HOURS PRN
Status: DISCONTINUED | OUTPATIENT
Start: 2024-07-23 | End: 2024-07-23 | Stop reason: HOSPADM

## 2024-07-23 RX ORDER — SIMETHICONE 125 MG
125 TABLET,CHEWABLE ORAL EVERY 6 HOURS PRN
Qty: 30 TABLET | Refills: 1 | Status: SHIPPED | OUTPATIENT
Start: 2024-07-23

## 2024-07-23 RX ORDER — SODIUM CHLORIDE, SODIUM LACTATE, POTASSIUM CHLORIDE, CALCIUM CHLORIDE 600; 310; 30; 20 MG/100ML; MG/100ML; MG/100ML; MG/100ML
100 INJECTION, SOLUTION INTRAVENOUS CONTINUOUS
Status: DISCONTINUED | OUTPATIENT
Start: 2024-07-23 | End: 2024-07-23 | Stop reason: HOSPADM

## 2024-07-23 RX ORDER — ACETAMINOPHEN 325 MG/1
975 TABLET ORAL ONCE
Status: COMPLETED | OUTPATIENT
Start: 2024-07-23 | End: 2024-07-23

## 2024-07-23 RX ORDER — ASPIRIN 81 MG
100 TABLET, DELAYED RELEASE (ENTERIC COATED) ORAL 2 TIMES DAILY
Qty: 30 TABLET | Refills: 1 | Status: SHIPPED | OUTPATIENT
Start: 2024-07-23 | End: 2024-08-22

## 2024-07-23 RX ORDER — OXYCODONE HYDROCHLORIDE 5 MG/1
5 TABLET ORAL EVERY 6 HOURS PRN
Qty: 12 TABLET | Refills: 0 | Status: SHIPPED | OUTPATIENT
Start: 2024-07-23

## 2024-07-23 RX ORDER — FENTANYL CITRATE 50 UG/ML
100 INJECTION, SOLUTION INTRAMUSCULAR; INTRAVENOUS ONCE
Status: COMPLETED | OUTPATIENT
Start: 2024-07-23 | End: 2024-07-23

## 2024-07-23 RX ORDER — DIPHENHYDRAMINE HYDROCHLORIDE 50 MG/ML
25 INJECTION INTRAMUSCULAR; INTRAVENOUS ONCE
Status: COMPLETED | OUTPATIENT
Start: 2024-07-23 | End: 2024-07-23

## 2024-07-23 RX ORDER — MIDAZOLAM HYDROCHLORIDE 1 MG/ML
2 INJECTION, SOLUTION INTRAMUSCULAR; INTRAVENOUS ONCE
Status: COMPLETED | OUTPATIENT
Start: 2024-07-23 | End: 2024-07-23

## 2024-07-23 RX ORDER — ONDANSETRON HYDROCHLORIDE 2 MG/ML
4 INJECTION, SOLUTION INTRAVENOUS ONCE AS NEEDED
Status: COMPLETED | OUTPATIENT
Start: 2024-07-23 | End: 2024-07-23

## 2024-07-23 RX ORDER — ROCURONIUM BROMIDE 10 MG/ML
INJECTION, SOLUTION INTRAVENOUS AS NEEDED
Status: DISCONTINUED | OUTPATIENT
Start: 2024-07-23 | End: 2024-07-23

## 2024-07-23 RX ORDER — PROPOFOL 10 MG/ML
INJECTION, EMULSION INTRAVENOUS AS NEEDED
Status: DISCONTINUED | OUTPATIENT
Start: 2024-07-23 | End: 2024-07-23

## 2024-07-23 RX ORDER — HYDROMORPHONE HYDROCHLORIDE 0.2 MG/ML
0.2 INJECTION INTRAMUSCULAR; INTRAVENOUS; SUBCUTANEOUS EVERY 5 MIN PRN
Status: DISCONTINUED | OUTPATIENT
Start: 2024-07-23 | End: 2024-07-23 | Stop reason: HOSPADM

## 2024-07-23 RX ORDER — CEFAZOLIN SODIUM 2 G/100ML
2 INJECTION, SOLUTION INTRAVENOUS ONCE
Status: COMPLETED | OUTPATIENT
Start: 2024-07-23 | End: 2024-07-23

## 2024-07-23 RX ORDER — ALBUTEROL SULFATE 0.83 MG/ML
2.5 SOLUTION RESPIRATORY (INHALATION) ONCE AS NEEDED
Status: DISCONTINUED | OUTPATIENT
Start: 2024-07-23 | End: 2024-07-23 | Stop reason: HOSPADM

## 2024-07-23 RX ORDER — HYDROMORPHONE HYDROCHLORIDE 2 MG/ML
INJECTION, SOLUTION INTRAMUSCULAR; INTRAVENOUS; SUBCUTANEOUS AS NEEDED
Status: DISCONTINUED | OUTPATIENT
Start: 2024-07-23 | End: 2024-07-23

## 2024-07-23 RX ORDER — IBUPROFEN 600 MG/1
600 TABLET ORAL EVERY 6 HOURS PRN
Qty: 30 TABLET | Refills: 1 | Status: SHIPPED | OUTPATIENT
Start: 2024-07-23 | End: 2024-08-22

## 2024-07-23 RX ORDER — ACETAMINOPHEN 325 MG/1
650 TABLET ORAL EVERY 4 HOURS PRN
Status: DISCONTINUED | OUTPATIENT
Start: 2024-07-23 | End: 2024-07-23 | Stop reason: HOSPADM

## 2024-07-23 ASSESSMENT — COLUMBIA-SUICIDE SEVERITY RATING SCALE - C-SSRS
2. HAVE YOU ACTUALLY HAD ANY THOUGHTS OF KILLING YOURSELF?: NO
2. HAVE YOU ACTUALLY HAD ANY THOUGHTS OF KILLING YOURSELF?: NO
6. HAVE YOU EVER DONE ANYTHING, STARTED TO DO ANYTHING, OR PREPARED TO DO ANYTHING TO END YOUR LIFE?: NO
6. HAVE YOU EVER DONE ANYTHING, STARTED TO DO ANYTHING, OR PREPARED TO DO ANYTHING TO END YOUR LIFE?: NO
1. IN THE PAST MONTH, HAVE YOU WISHED YOU WERE DEAD OR WISHED YOU COULD GO TO SLEEP AND NOT WAKE UP?: NO
1. IN THE PAST MONTH, HAVE YOU WISHED YOU WERE DEAD OR WISHED YOU COULD GO TO SLEEP AND NOT WAKE UP?: NO

## 2024-07-23 ASSESSMENT — PAIN SCALES - GENERAL
PAINLEVEL_OUTOF10: 0 - NO PAIN
PAINLEVEL_OUTOF10: 10 - WORST POSSIBLE PAIN
PAINLEVEL_OUTOF10: 0 - NO PAIN
PAINLEVEL_OUTOF10: 7
PAINLEVEL_OUTOF10: 3
PAINLEVEL_OUTOF10: 2
PAINLEVEL_OUTOF10: 0 - NO PAIN
PAINLEVEL_OUTOF10: 0 - NO PAIN

## 2024-07-23 ASSESSMENT — PAIN - FUNCTIONAL ASSESSMENT
PAIN_FUNCTIONAL_ASSESSMENT: FLACC (FACE, LEGS, ACTIVITY, CRY, CONSOLABILITY)
PAIN_FUNCTIONAL_ASSESSMENT: 0-10
PAIN_FUNCTIONAL_ASSESSMENT: 0-10
PAIN_FUNCTIONAL_ASSESSMENT: CPOT (CRITICAL CARE PAIN OBSERVATION TOOL)
PAIN_FUNCTIONAL_ASSESSMENT: FLACC (FACE, LEGS, ACTIVITY, CRY, CONSOLABILITY)
PAIN_FUNCTIONAL_ASSESSMENT: 0-10

## 2024-07-23 ASSESSMENT — PAIN DESCRIPTION - FREQUENCY: FREQUENCY: CONSTANT/CONTINUOUS

## 2024-07-23 ASSESSMENT — PAIN DESCRIPTION - PAIN TYPE: TYPE: ACUTE PAIN

## 2024-07-23 ASSESSMENT — PAIN DESCRIPTION - DESCRIPTORS
DESCRIPTORS: CRAMPING
DESCRIPTORS: SHARP;STABBING

## 2024-07-23 ASSESSMENT — PAIN DESCRIPTION - ONSET: ONSET: ONGOING

## 2024-07-23 ASSESSMENT — PAIN DESCRIPTION - PROGRESSION: CLINICAL_PROGRESSION: GRADUALLY WORSENING

## 2024-07-23 ASSESSMENT — PAIN DESCRIPTION - LOCATION: LOCATION: HEAD

## 2024-07-23 NOTE — ANESTHESIA POSTPROCEDURE EVALUATION
Patient: Meaghan Gutierrez    Procedure Summary       Date: 07/23/24 Room / Location: [facility] OR 12 / [facility]    Anesthesia Start: 1215 Anesthesia Stop: 1421    Procedures:       Hysterectomy Transvaginal Robot-Assisted with Salpingo-Oophorectomy (Bilateral)      Cystoscopy Rigid Diagnosis:       Abnormal uterine bleeding      (AUB)    Surgeons: Andressa Chanel DO Responsible Provider: Ana Moraes MD    Anesthesia Type: general, regional ASA Status: 2            Anesthesia Type: general, regional    Vitals Value Taken Time   /90 07/23/24 1441   Temp 36.1 °C (97 °F) 07/23/24 1425   Pulse 76 07/23/24 1449   Resp 13 07/23/24 1449   SpO2 100 % 07/23/24 1449   Vitals shown include unfiled device data.    Anesthesia Post Evaluation    Patient location during evaluation: bedside  Patient participation: complete - patient participated  Level of consciousness: awake and alert  Pain management: adequate  Multimodal analgesia pain management approach  Airway patency: patent  Cardiovascular status: acceptable  Respiratory status: acceptable  Hydration status: acceptable  Postoperative Nausea and Vomiting: none        No notable events documented.

## 2024-07-23 NOTE — NURSING NOTE
Assist pt up to bathroom.  Void measured for 50 ml .  Iv fluids to bolus.  Pt states feels nauseous.  Will medicate.

## 2024-07-23 NOTE — ANESTHESIA PROCEDURE NOTES
Airway  Date/Time: 7/23/2024 12:22 PM  Urgency: elective    Airway not difficult    Staffing  Performed: SRNA   Authorized by: Ana Moraes MD    Performed by: Melvi Villalpando  Patient location during procedure: OR    Indications and Patient Condition  Indications for airway management: anesthesia  Spontaneous ventilation: present  Sedation level: deep  Preoxygenated: yes  Patient position: sniffing  Mask difficulty assessment: 2 - vent by mask + OA or adjuvant +/- NMBA  Planned trial extubation    Final Airway Details  Final airway type: endotracheal airway      Successful airway: ETT  Cuffed: yes   Successful intubation technique: direct laryngoscopy  Facilitating devices/methods: intubating stylet  Blade: Chris  Blade size: #3  ETT size (mm): 7.5  Cormack-Lehane Classification: grade IIa - partial view of glottis  Placement verified by: chest auscultation and capnometry   Measured from: lips  ETT to lips (cm): 21  Number of attempts at approach: 1

## 2024-07-23 NOTE — PERIOPERATIVE NURSING NOTE
VSS. IV INFUSING WELL. PAIN TOLERABLE LEVEL 3. NO N/V AT THIS TIME. FAMILY PRESENT. ATTEMPTED TO VOID BUT UNABLE TO AT THIS TIME. TAKING PO WELL. SPOUSE AT THE BEDSIDE.  1615 DISCHARGE INSTRUCTIONS REVIEWED AND UNDERSTOOD WITH SPOUSE. UP TO THE BATHROOM TO VOID.  1620 VOIDED 25 ml. WILL NEED MORE HYDRATION AND A 2 ND ATTEMPT TO VOID.

## 2024-07-23 NOTE — POST-PROCEDURE NOTE
Patient up to bathroom, void measured 50mL. Bladder scan performed measuring 55mL. Dr. Chanel notified and gave the ok to discharge patient home.

## 2024-07-23 NOTE — ANESTHESIA PREPROCEDURE EVALUATION
Patient: Meaghan Gutierrez    Procedure Information       Date/Time: 24 1200    Procedure: Hysterectomy Transvaginal Robot-Assisted with Salpingo-Oophorectomy (Bilateral) - 10:15 A.M. START TIME, ROBOTIC ASSISTED TLH, BS, CYSTOSCOPY    Location: [facility]  / [facility]    Surgeons: Andressa Chanel, DO            Relevant Problems   Anesthesia (within normal limits)      Cardiac   (+) Hyperlipidemia      Pulmonary   (+) LIANA (obstructive sleep apnea)      Neuro   (+) Bipolar disorder (Multi) (On Lamictal and Zoloft)   (+) Generalized anxiety disorder   (+) Major depressive disorder      GI   (+) Gastroesophageal reflux disease      /Renal (within normal limits)      Liver (within normal limits)      Endocrine (within normal limits)      Hematology (within normal limits)      Musculoskeletal (within normal limits)      HEENT   (+) Chronic sinusitis   (+) Seasonal allergies      ID (within normal limits)      Skin (within normal limits)      GYN (within normal limits)     Past Medical History:   Diagnosis Date    Anxiety disorder, unspecified 2014    Anxiety    Endometriosis of cervix     Personal history of other mental and behavioral disorders 2014    History of depression    Personal history of other specified conditions 2014    History of headache    Sleep apnea      Past Surgical History:   Procedure Laterality Date    ANTERIOR CRUCIATE LIGAMENT REPAIR Left     cadaver    BLADDER SURGERY  2014    Bladder Surgery     SECTION, CLASSIC  2014     Section    TONSILLECTOMY  2014    Tonsillectomy     Allergies   Allergen Reactions    Bee Venom Protein (Honey Bee) Anaphylaxis    Latex Anaphylaxis and Rash     blisters    Penicillins Hives, Rash, Unknown and Nausea/vomiting     Other reaction(s): Unknown Reaction    Red Dye Rash    Adhesive Tape-Silicones Other     Blisters    Lidocaine Nausea/vomiting    Nickel Swelling     Pt reports  wearing silver metal  jewelry causes swelling    Prednisone Unknown    Procaine Unknown and Nausea/vomiting   Had IV lidocaine and ancef in previous anesthesia records, as well as 0.5% ropivacaine with Adductor Canal block for Left ACL repair - all with no issues.      Clinical information reviewed:                   NPO Detail:  No data recorded     Physical Exam    Airway  Mallampati: II  TM distance: >3 FB  Neck ROM: full     Cardiovascular   Rhythm: regular  Rate: normal     Dental    Pulmonary   Breath sounds clear to auscultation     Abdominal            Anesthesia Plan    History of general anesthesia?: yes  History of complications of general anesthesia?: no    ASA 2     general and regional     intravenous induction   Postoperative administration of opioids is intended.  Trial extubation is planned.  Anesthetic plan and risks discussed with patient.  Use of blood products discussed with patient who.

## 2024-07-23 NOTE — ANESTHESIA PROCEDURE NOTES
Peripheral Block    Patient location during procedure: pre-op  Start time: 7/23/2024 12:05 PM  End time: 7/23/2024 12:09 PM  Reason for block: at surgeon's request and post-op pain management  Staffing  Performed: attending   Authorized by: Ana Moraes MD    Performed by: Ana Moraes MD  Preanesthetic Checklist  Completed: patient identified, IV checked, site marked, risks and benefits discussed, surgical consent, monitors and equipment checked, pre-op evaluation and timeout performed   Timeout performed at: 7/23/2024 12:01 PM  Peripheral Block  Patient position: laying flat  Prep: ChloraPrep  Patient monitoring: heart rate, cardiac monitor and continuous pulse ox  Block type: QL  Laterality: B/L  Injection technique: single-shot  Guidance: ultrasound guided  Local infiltration: ropivacaine  Infiltration strength: 0.5 %  Dose: 20 mL  Needle  Needle gauge: 21 G  Needle length: 10 cm  Needle localization: ultrasound guidance     image stored in chart  Assessment  Injection assessment: negative aspiration for heme, no paresthesia on injection, incremental injection and local visualized surrounding nerve on ultrasound  Paresthesia pain: none  Heart rate change: no  Slow fractionated injection: yes  Additional Notes  5mg Decadron on each side (10mg total)

## 2024-07-23 NOTE — OP NOTE
Hysterectomy Transvaginal Robot-Assisted with Salpingo-Oophorectomy (B), Cystoscopy Rigid Operative Note     Date: 2024  OR Location: [facility]    Name: Meaghan Gutierrez, : 1985, Age: 39 y.o., MRN: [id number], Sex: female    Diagnosis  Pre-op Diagnosis      * Abnormal uterine bleeding [N93.9] Post-op Diagnosis     * Abnormal uterine bleeding [N93.9]     Procedures  Hysterectomy Transvaginal Robot-Assisted with Salpingo-Oophorectomy  47270 - [place] LAPS TOTAL HYSTERECT 250 GM/< W/RMVL TUBE/OVARY    Cystoscopy Rigid  89214 - [place] CYSTOURETHROSCOPY      Surgeons      * Andressa Chanel - Primary    Resident/Fellow/Other Assistant:  Surgeons and Role:  * No surgeons found with a matching role *    Procedure Summary  Anesthesia: Regional, General  ASA: II  Anesthesia Staff: Anesthesiologist: Ana Moraes MD  CRNA: CAMRYN CabezasCRNA; ELIZABETH Low  SRNA: Melvi Villalpando  Estimated Blood Loss: 150mL  Intra-op Medications:   Administrations occurring from 1200 to 1415 on 24:   Medication Name Total Dose   ceFAZolin (Ancef) 2 g in dextrose (iso)  mL 2 g   fentaNYL PF (Sublimaze) injection 100 mcg 100 mcg   indigotindisulfonate sodium (Bludigo) injection 5 mL 5 mL   midazolam (Versed) injection 2 mg 2 mg              Anesthesia Record               Intraprocedure I/O Totals          Intake    ceFAZolin (Ancef) 2 g in dextrose (iso)  mL 100.00 mL    Total Intake 100 mL       Output    Urine 250 mL    Est. Blood Loss 150 mL    Total Output 400 mL       Net    Net Volume -300 mL          Specimen:   ID Type Source Tests Collected by Time   1 : UTERUS, CERVIX, FALLOPIAN TUBES AND OVARIES BILATERAL Tissue UTERUS, CERVIX, FALLOPIAN TUBES AND OVARIES BILATERAL SURGICAL PATHOLOGY EXAM Andressa Chanel DO 2024 1347        Staff:   Circulator: Marilyn Holmanub Person: Aung Holmanub Person: Maryellen Holmanub Person: Giana  Circulator: Geetha         Drains and/or Catheters:   [REMOVED] Urethral  Catheter Non-latex 16 Fr. (Removed)       Tourniquet Times:         Implants:     Findings: Uterus    Indications: Meaghan Gutierrez is an 39 y.o. female who is having surgery for AUB.     The patient was seen in the preoperative area. The risks, benefits, complications, treatment options, non-operative alternatives, expected recovery and outcomes were discussed with the patient. The possibilities of reaction to medication, pulmonary aspiration, injury to surrounding structures, bleeding, recurrent infection, the need for additional procedures, failure to diagnose a condition, and creating a complication requiring transfusion or operation were discussed with the patient. The patient concurred with the proposed plan, giving informed consent.  The site of surgery was properly noted/marked if necessary per policy. The patient has been actively warmed in preoperative area. Preoperative antibiotics have been ordered and given within 1 hours of incision. Venous thrombosis prophylaxis have been ordered including bilateral sequential compression devices    Procedure Details:     Procedure Details: Patient was taken to the operating room and placed in lithotomy position in yellowfin stirrups.  Patient was then prepped and draped in the usual sterile fashion.  A bladder catheter was inserted.  2.5 cm Coco manipulator was placed in the uterus after sounding the uterus to 11 cm.    Attention was turned to the abdomen where infraumbilical 8 mm incision was created with a scalpel.  An 8 mm robotic trocar was then inserted with laparoscope in place.  Pneumoperitoneum was established with carbon dioxide without difficulty.  Two more 8 mm ports were placed lateral to the umbilicus.  A 12 mm air seal port was then placed in the right upper quadrant.    The robot was then docked without difficulty.  The right tube and ovary were then identified.  Then using the vessel sealer the right infundibulopelvic ligament was clamped cut and  ligated.  This was continued along on to the round ligament.  A bladder flap was then started and continued across the anterior portion of the uterus.  There were extensive adhesions of the bladder that were taken down with blunt and sharp dissection. The bladder was backfilled with methylene blue. The bladder edge was identified without defect. The same was repeated on the contralateral side. The left uterine artery was then identified and clamped and ligated using the vessel sealer.  Same was repeated on the contralateral side.  The vaginal balloon was then inflated and using the monopolar scissors a colpotomy was created.  The uterus was then delivered through the vagina.  Good hemostasis of the vaginal cuff was noted.  It was difficult to visualize the cuff due to redundant tissue, a tight pelvic outlet and sigmoid colon. The vaginal cuff was closed from the vagina. A running locked stitch of 0-vicryl was placed without difficulty and hemostasis was confirmed.  The pelvis was then irrigated and good hemostasis was confirmed.  All instruments were then removed from the abdomen the robot was undocked and all incisions were closed with 4-0 Vicryl and Dermabond.    A cystoscopy was performed using a 70 degree cystoscope.  Bludigo IV had been given prior to.  Bilateral ureteral orifices were identified with efflux of urine from both. This concluded the procedure alternates but in the neurons were correct.  Complications:  None; patient tolerated the procedure well.    Disposition: PACU - hemodynamically stable.  Condition: stable         Additional Details:       Andressa Chanel  Phone Number: [phone]

## 2024-07-24 VITALS
HEART RATE: 93 BPM | HEIGHT: 66 IN | OXYGEN SATURATION: 96 % | DIASTOLIC BLOOD PRESSURE: 78 MMHG | RESPIRATION RATE: 18 BRPM | TEMPERATURE: 97.9 F | BODY MASS INDEX: 43.26 KG/M2 | SYSTOLIC BLOOD PRESSURE: 143 MMHG | WEIGHT: 269.18 LBS

## 2024-07-24 NOTE — ED PROVIDER NOTES
HPI   Chief Complaint   Patient presents with    Headache     Pt has had a headache for the last 2 days. Pt had a hysterectomy at [facility], [place], operated by [facility] this afternoon and her headache has gotten significantly worse since. Pt has vomited.       Patient presents with headache.  She states that she had a hysterectomy performed earlier today by Dr. Chanel.  She states that prior to her surgery, she already had the headache.  She has been having headaches more recently.  The headache is worst in the front of the head.  Lights and sounds make her symptoms worse.  No injuries recently.              Patient History   Past Medical History:   Diagnosis Date    Anxiety disorder, unspecified 2014    Anxiety    Endometriosis of cervix     Personal history of other mental and behavioral disorders 2014    History of depression    Personal history of other specified conditions 2014    History of headache    Sleep apnea      Past Surgical History:   Procedure Laterality Date    ANTERIOR CRUCIATE LIGAMENT REPAIR Left     cadaver    BLADDER SURGERY  2014    Bladder Surgery     SECTION, CLASSIC  2014     Section    TONSILLECTOMY  2014    Tonsillectomy     No family history on file.  Social History     Tobacco Use    Smoking status: Former     Types: Cigarettes    Smokeless tobacco: Never   Vaping Use    Vaping status: Every Day    Start date: 2023    Substances: Nicotine   Substance Use Topics    Alcohol use: Never     Comment: RARELY 2 X YEAR    Drug use: Never       Physical Exam   ED Triage Vitals [24 2250]   Temperature Heart Rate Respirations BP   36.9 °C (98.4 °F) 97 16 (!) 176/104      Pulse Ox Temp Source Heart Rate Source Patient Position   97 % Temporal Monitor Standing      BP Location FiO2 (%)     Right arm --       Physical Exam  HENT:      Head: Normocephalic.   Eyes:      Extraocular Movements: Extraocular movements intact.      Pupils: Pupils are equal, round, and  reactive to light.   Neck:      Comments: No nuchal rigidity  Neurological:      Mental Status: She is alert.      Comments: Strength and sensation intact in bilateral upper and lower extremities.  Patient awake, alert, answers questions appropriately.           ED Course & MDM   Diagnoses as of 07/23/24 6681   Acute nonintractable headache, unspecified headache type                       No data recorded                      Medical Decision Making  In setting of headaches which have been intermittent but progressively worsening, head CT obtained to evaluate for intracranial mass or other abnormality.  This was negative.  There is no nuchal rigidity or fever and headache did not begin suddenly.  My suspicion for SAH or meningitis as etiology of symptoms is very low.  Increased intracranial pressure felt to be very unlikely.  Following dose of Compazine and Benadryl, symptoms resolved.  Patient advised to follow-up with primary care physician and was given referral to neurology.  Return precautions given for any worsening symptoms.  Parts of this chart were completed with dictation software, please excuse any errors in transcription.        Procedure  Procedures     Fran Mitchell MD  07/24/24 0057

## 2024-07-24 NOTE — DISCHARGE INSTRUCTIONS
You have been given medications for your headache.  You should follow-up with both your primary care physician as well as neurology.  Return to the emergency department with any worsening symptoms.    It is important to remember that your care does not end here and you must continue to monitor your condition closely. Please return to the emergency department for any worsening or concerning signs or symptoms as directed by our conversations and the discharge instructions. If you do not have a doctor please contact the referral number on your discharge instructions. Please contact any physician specialists provided in your discharge notes as it is very important to follow up with them regarding your condition. If you are unable to reach the physicians provided, please come back to the Emergency Department at any time.    Return to emergency room without delay for ANY new or worsening pains or for any other symptoms or concerns.  Return with worsening pains, nausea, vomiting, trouble breathing, palpitations, shortness of breath, inability to pass stool or urine, loss of control of stool or urine, any numbness or tingling (that is not normal for you), uncontrolled fevers, the passing of blood or other material in stool or urine, rashes, pains or for any other symptoms or concerns you may have.  You are always welcome to return to the ER at any time for any reason or for any other concerns you may have.

## 2024-07-30 ENCOUNTER — LAB REQUISITION (OUTPATIENT)
Dept: LAB | Facility: HOSPITAL | Age: 39
End: 2024-07-30
Payer: COMMERCIAL

## 2024-07-30 DIAGNOSIS — Z09 ENCOUNTER FOR FOLLOW-UP EXAMINATION AFTER COMPLETED TREATMENT FOR CONDITIONS OTHER THAN MALIGNANT NEOPLASM: ICD-10-CM

## 2024-07-30 DIAGNOSIS — R82.90 UNSPECIFIED ABNORMAL FINDINGS IN URINE: ICD-10-CM

## 2024-07-30 DIAGNOSIS — R82.998 OTHER ABNORMAL FINDINGS IN URINE: ICD-10-CM

## 2024-07-30 DIAGNOSIS — R31.29 OTHER MICROSCOPIC HEMATURIA: ICD-10-CM

## 2024-07-30 LAB
LABORATORY COMMENT REPORT: NORMAL
PATH REPORT.FINAL DX SPEC: NORMAL
PATH REPORT.GROSS SPEC: NORMAL
PATH REPORT.RELEVANT HX SPEC: NORMAL
PATH REPORT.TOTAL CANCER: NORMAL

## 2024-07-30 PROCEDURE — 87086 URINE CULTURE/COLONY COUNT: CPT

## 2024-08-01 LAB — BACTERIA UR CULT: NORMAL

## 2024-08-19 ENCOUNTER — HOSPITAL ENCOUNTER (OUTPATIENT)
Dept: RADIOLOGY | Facility: HOSPITAL | Age: 39
Discharge: HOME | End: 2024-08-19
Payer: COMMERCIAL

## 2024-08-19 DIAGNOSIS — M79.644 PAIN IN RIGHT FINGER(S): ICD-10-CM

## 2024-08-19 PROCEDURE — 73140 X-RAY EXAM OF FINGER(S): CPT | Mod: RIGHT SIDE | Performed by: RADIOLOGY

## 2024-08-19 PROCEDURE — 73140 X-RAY EXAM OF FINGER(S): CPT | Mod: RT

## 2024-09-30 ENCOUNTER — OFFICE VISIT (OUTPATIENT)
Dept: PRIMARY CARE | Facility: CLINIC | Age: 39
End: 2024-09-30
Payer: COMMERCIAL

## 2024-09-30 VITALS
DIASTOLIC BLOOD PRESSURE: 72 MMHG | SYSTOLIC BLOOD PRESSURE: 124 MMHG | BODY MASS INDEX: 44.52 KG/M2 | HEART RATE: 82 BPM | TEMPERATURE: 97.5 F | HEIGHT: 66 IN | WEIGHT: 277 LBS | OXYGEN SATURATION: 99 %

## 2024-09-30 DIAGNOSIS — F31.9 BIPOLAR AFFECTIVE DISORDER, REMISSION STATUS UNSPECIFIED (MULTI): ICD-10-CM

## 2024-09-30 DIAGNOSIS — E55.9 VITAMIN D DEFICIENCY: ICD-10-CM

## 2024-09-30 DIAGNOSIS — R53.83 FATIGUE, UNSPECIFIED TYPE: Primary | ICD-10-CM

## 2024-09-30 DIAGNOSIS — R73.03 PREDIABETES: ICD-10-CM

## 2024-09-30 PROCEDURE — 99214 OFFICE O/P EST MOD 30 MIN: CPT | Performed by: FAMILY MEDICINE

## 2024-09-30 PROCEDURE — 3008F BODY MASS INDEX DOCD: CPT | Performed by: FAMILY MEDICINE

## 2024-09-30 PROCEDURE — 1036F TOBACCO NON-USER: CPT | Performed by: FAMILY MEDICINE

## 2024-09-30 ASSESSMENT — PAIN SCALES - GENERAL: PAINLEVEL: 0-NO PAIN

## 2024-09-30 ASSESSMENT — PATIENT HEALTH QUESTIONNAIRE - PHQ9
2. FEELING DOWN, DEPRESSED OR HOPELESS: NOT AT ALL
SUM OF ALL RESPONSES TO PHQ9 QUESTIONS 1 AND 2: 0
1. LITTLE INTEREST OR PLEASURE IN DOING THINGS: NOT AT ALL

## 2024-09-30 NOTE — PATIENT INSTRUCTIONS
Problem List Items Addressed This Visit             ICD-10-CM    Bipolar disorder (Multi) F31.9     - Continue to follow with established mental health specialist per protocol         Fatigue - Primary R53.83     - Will check sugar average and insulin level to see if there is any contributing metabolic deficiencies relating to fatigue episodes         Relevant Orders    Hemoglobin A1c    Insulin, Fasting    Vitamin D deficiency E55.9     - Deficiency improved with repeat blood work completed in July to which we will continue with low-dose daily supplementation         Prediabetes R73.03     - Will check A1c sugar average with previous sugars bordering edge of diabetes         Relevant Orders    Hemoglobin A1c    Insulin, Fasting         Counseling:       Medication education:         Education:  The patient is counseled regarding potential side-effects of all new medications        Understanding:  Patient expressed understanding        Adherence:  No barriers to adherence identified

## 2024-09-30 NOTE — ASSESSMENT & PLAN NOTE
- Deficiency improved with repeat blood work completed in July to which we will continue with low-dose daily supplementation

## 2024-09-30 NOTE — ASSESSMENT & PLAN NOTE
- Will check sugar average and insulin level to see if there is any contributing metabolic deficiencies relating to fatigue episodes

## 2024-09-30 NOTE — PROGRESS NOTES
Outpatient Visit Note    Chief Complaint   Patient presents with    Fatigue     When pt eats she gets tired and when she doesn't eat she gets light headed. It has been going on for about a year but has been getting progressively worse.         HPI:  Meaghan Gutierrez is a 39 y.o. female who presents to the office secondary to fatigue concerns.  Patient was last seen in April 2024 secondary to worries regarding normal blood glucose levels.    In interval, patient did undergo hysterectomy.  They have otherwise been following regularly with established mental health team at [facility].    Past medical history significant for prediabetes with last A1c sugar average completed in April 2024 which was 6.4%.  They report concerns that sugars have been running high recently, reporting progressive fatigue.  Denies any overt polyuria, polydipsia or polyphagia.  No reported acute vision/sensation changes.    Panel blood work was completed/11/24 including A1c, vitamin D, CMP and CBC which was remarkable for hyperglycemia with prediabetic A1c of 6.4% and persistent vitamin D deficiency.  Supplementation regiment was initiated with repeat level checked on 7/16/2024 which had normalized.  Patient had subsequent blood work done for preoperative testing in July which was generally unremarkable.    They report that over the course the last year she has had intermittent episodes of chronic fatigue that is worsened after eating.  States that if she goes without eating, she often feels lightheaded.  Notes that the symptoms have been become progressively worse over the course of the last several months.          Current Medications  Current Outpatient Medications   Medication Instructions    Adderall XR 30 mg 24 hr capsule 30 mg, oral, Every morning    amphetamine-dextroamphetamine (Adderall) 5 mg tablet 5 mg, Take 1 Tablet by mouth once daily Every Friday , Saturday and Sunday    cetirizine (ZYRTEC) 10 mg, oral, Daily     cholecalciferol (VITAMIN D-3) 2,000 Units, oral, Daily    cloNIDine (CATAPRES) 0.2 mg, oral, 3 times daily    gabapentin (NEURONTIN) 400 mg, oral, 2 times daily    Trulicity 0.75 mg, subcutaneous, Once Weekly    Vraylar 6 mg, oral, Daily        Allergies  Allergies   Allergen Reactions    Bee Venom Protein (Honey Bee) Anaphylaxis    Latex Anaphylaxis and Rash     blisters    Penicillins Hives, Rash, Unknown and Nausea/vomiting     Other reaction(s): Unknown Reaction    Red Dye Rash    Adhesive Tape-Silicones Other     Blisters    Lidocaine Nausea/vomiting    Nickel Swelling     Pt reports  wearing silver metal jewelry causes swelling    Prednisone Unknown    Procaine Unknown and Nausea/vomiting        Past Medical History:   Diagnosis Date    Anxiety disorder, unspecified 2014    Anxiety    Endometriosis of cervix     Personal history of other mental and behavioral disorders 2014    History of depression    Personal history of other specified conditions 2014    History of headache    Sleep apnea       Past Surgical History:   Procedure Laterality Date    ANTERIOR CRUCIATE LIGAMENT REPAIR Left     cadaver    BLADDER SURGERY  2014    Bladder Surgery     SECTION, CLASSIC  2014     Section    HYSTERECTOMY      TONSILLECTOMY  2014    Tonsillectomy     No family history on file.  Social History     Tobacco Use    Smoking status: Former     Types: Cigarettes    Smokeless tobacco: Never   Vaping Use    Vaping status: Every Day    Start date: 2023    Substances: Nicotine   Substance Use Topics    Alcohol use: Never     Comment: RARELY 2 X YEAR    Drug use: Never       ROS  All pertinent positive symptoms are included in the history of present illness.  All other systems have been reviewed and are negative and noncontributory to this patient's current ailments.    VITAL SIGNS  Vitals:    24 0753   BP: 124/72   Pulse: 82   Temp: 36.4 °C (97.5 °F)   SpO2: 99%          PHYSICAL EXAM  GENERAL APPEARANCE: alert and oriented, Pleasant and cooperative, No Acute Distress  HEENT: EOMI, PERRLA, MMM  HEART: RRR, normal S1S2, no murmurs, click or rubs  LUNGS: clear to auscultation bilaterally, no wheezes/rhonchi/rales  EXTREMITIES: no edema, normal ROM  SKIN: normal, no rash, unremarkable  NEUROLOGIC EXAM: non-focal exam  MUSCULOSKELETAL: no gross abnormalities  PSYCH: affect is normal, eye contact is good    Assessment/Plan   Problem List Items Addressed This Visit             ICD-10-CM    Bipolar disorder (Multi) F31.9     - Continue to follow with established mental health specialist per protocol         Fatigue - Primary R53.83     - Will check sugar average and insulin level to see if there is any contributing metabolic deficiencies relating to fatigue episodes         Relevant Orders    Hemoglobin A1c    Insulin, Fasting    Vitamin D deficiency E55.9     - Deficiency improved with repeat blood work completed in July to which we will continue with low-dose daily supplementation         Prediabetes R73.03     - Will check A1c sugar average with previous sugars bordering edge of diabetes         Relevant Orders    Hemoglobin A1c    Insulin, Fasting         Counseling:       Medication education:         Education:  The patient is counseled regarding potential side-effects of all new medications        Understanding:  Patient expressed understanding        Adherence:  No barriers to adherence identified

## 2024-10-01 ENCOUNTER — APPOINTMENT (OUTPATIENT)
Dept: RADIOLOGY | Facility: HOSPITAL | Age: 39
End: 2024-10-01
Payer: COMMERCIAL

## 2024-10-01 ENCOUNTER — HOSPITAL ENCOUNTER (EMERGENCY)
Facility: HOSPITAL | Age: 39
Discharge: HOME | End: 2024-10-01
Attending: STUDENT IN AN ORGANIZED HEALTH CARE EDUCATION/TRAINING PROGRAM
Payer: COMMERCIAL

## 2024-10-01 ENCOUNTER — APPOINTMENT (OUTPATIENT)
Dept: CARDIOLOGY | Facility: HOSPITAL | Age: 39
End: 2024-10-01
Payer: COMMERCIAL

## 2024-10-01 VITALS
SYSTOLIC BLOOD PRESSURE: 132 MMHG | HEIGHT: 66 IN | DIASTOLIC BLOOD PRESSURE: 89 MMHG | OXYGEN SATURATION: 95 % | HEART RATE: 74 BPM | BODY MASS INDEX: 45.42 KG/M2 | RESPIRATION RATE: 18 BRPM | WEIGHT: 282.6 LBS | TEMPERATURE: 97.7 F

## 2024-10-01 DIAGNOSIS — R42 LIGHTHEADEDNESS: Primary | ICD-10-CM

## 2024-10-01 DIAGNOSIS — R51.9 ACUTE NONINTRACTABLE HEADACHE, UNSPECIFIED HEADACHE TYPE: ICD-10-CM

## 2024-10-01 DIAGNOSIS — R74.8 ELEVATED LIVER ENZYMES: ICD-10-CM

## 2024-10-01 LAB
ALBUMIN SERPL BCP-MCNC: 4.1 G/DL (ref 3.4–5)
ALP SERPL-CCNC: 89 U/L (ref 33–110)
ALT SERPL W P-5'-P-CCNC: 60 U/L (ref 7–45)
ANION GAP SERPL CALCULATED.3IONS-SCNC: 12 MMOL/L (ref 10–20)
AST SERPL W P-5'-P-CCNC: 55 U/L (ref 9–39)
BASOPHILS # BLD AUTO: 0.09 X10*3/UL (ref 0–0.1)
BASOPHILS NFR BLD AUTO: 1.2 %
BILIRUB SERPL-MCNC: 0.4 MG/DL (ref 0–1.2)
BUN SERPL-MCNC: 8 MG/DL (ref 6–23)
CALCIUM SERPL-MCNC: 9.7 MG/DL (ref 8.6–10.3)
CHLORIDE SERPL-SCNC: 105 MMOL/L (ref 98–107)
CO2 SERPL-SCNC: 23 MMOL/L (ref 21–32)
CREAT SERPL-MCNC: 0.71 MG/DL (ref 0.5–1.05)
EGFRCR SERPLBLD CKD-EPI 2021: >90 ML/MIN/1.73M*2
EOSINOPHIL # BLD AUTO: 0.15 X10*3/UL (ref 0–0.7)
EOSINOPHIL NFR BLD AUTO: 2 %
ERYTHROCYTE [DISTWIDTH] IN BLOOD BY AUTOMATED COUNT: 14.7 % (ref 11.5–14.5)
GLUCOSE SERPL-MCNC: 120 MG/DL (ref 74–99)
HCT VFR BLD AUTO: 40.3 % (ref 36–46)
HGB BLD-MCNC: 12.8 G/DL (ref 12–16)
IMM GRANULOCYTES # BLD AUTO: 0.03 X10*3/UL (ref 0–0.7)
IMM GRANULOCYTES NFR BLD AUTO: 0.4 % (ref 0–0.9)
LIPASE SERPL-CCNC: 19 U/L (ref 9–82)
LYMPHOCYTES # BLD AUTO: 2.4 X10*3/UL (ref 1.2–4.8)
LYMPHOCYTES NFR BLD AUTO: 32.3 %
MCH RBC QN AUTO: 25 PG (ref 26–34)
MCHC RBC AUTO-ENTMCNC: 31.8 G/DL (ref 32–36)
MCV RBC AUTO: 79 FL (ref 80–100)
MONOCYTES # BLD AUTO: 0.52 X10*3/UL (ref 0.1–1)
MONOCYTES NFR BLD AUTO: 7 %
NEUTROPHILS # BLD AUTO: 4.25 X10*3/UL (ref 1.2–7.7)
NEUTROPHILS NFR BLD AUTO: 57.1 %
NRBC BLD-RTO: 0 /100 WBCS (ref 0–0)
PLATELET # BLD AUTO: 349 X10*3/UL (ref 150–450)
POTASSIUM SERPL-SCNC: 4 MMOL/L (ref 3.5–5.3)
PROT SERPL-MCNC: 7.5 G/DL (ref 6.4–8.2)
RBC # BLD AUTO: 5.13 X10*6/UL (ref 4–5.2)
SODIUM SERPL-SCNC: 136 MMOL/L (ref 136–145)
TSH SERPL-ACNC: 2.78 MIU/L (ref 0.44–3.98)
WBC # BLD AUTO: 7.4 X10*3/UL (ref 4.4–11.3)

## 2024-10-01 PROCEDURE — 36415 COLL VENOUS BLD VENIPUNCTURE: CPT | Performed by: STUDENT IN AN ORGANIZED HEALTH CARE EDUCATION/TRAINING PROGRAM

## 2024-10-01 PROCEDURE — 96361 HYDRATE IV INFUSION ADD-ON: CPT

## 2024-10-01 PROCEDURE — 93005 ELECTROCARDIOGRAM TRACING: CPT

## 2024-10-01 PROCEDURE — 99285 EMERGENCY DEPT VISIT HI MDM: CPT | Mod: 25

## 2024-10-01 PROCEDURE — 96360 HYDRATION IV INFUSION INIT: CPT

## 2024-10-01 PROCEDURE — 84075 ASSAY ALKALINE PHOSPHATASE: CPT | Performed by: STUDENT IN AN ORGANIZED HEALTH CARE EDUCATION/TRAINING PROGRAM

## 2024-10-01 PROCEDURE — 70450 CT HEAD/BRAIN W/O DYE: CPT | Performed by: RADIOLOGY

## 2024-10-01 PROCEDURE — 83690 ASSAY OF LIPASE: CPT | Performed by: STUDENT IN AN ORGANIZED HEALTH CARE EDUCATION/TRAINING PROGRAM

## 2024-10-01 PROCEDURE — 70450 CT HEAD/BRAIN W/O DYE: CPT

## 2024-10-01 PROCEDURE — 84443 ASSAY THYROID STIM HORMONE: CPT | Performed by: STUDENT IN AN ORGANIZED HEALTH CARE EDUCATION/TRAINING PROGRAM

## 2024-10-01 PROCEDURE — 85025 COMPLETE CBC W/AUTO DIFF WBC: CPT | Performed by: STUDENT IN AN ORGANIZED HEALTH CARE EDUCATION/TRAINING PROGRAM

## 2024-10-01 PROCEDURE — 2500000004 HC RX 250 GENERAL PHARMACY W/ HCPCS (ALT 636 FOR OP/ED): Performed by: STUDENT IN AN ORGANIZED HEALTH CARE EDUCATION/TRAINING PROGRAM

## 2024-10-01 RX ORDER — DEXTROAMPHETAMINE SACCHARATE, AMPHETAMINE ASPARTATE, DEXTROAMPHETAMINE SULFATE AND AMPHETAMINE SULFATE 7.5; 7.5; 7.5; 7.5 MG/1; MG/1; MG/1; MG/1
30 TABLET ORAL DAILY
COMMUNITY

## 2024-10-01 RX ORDER — ESTRADIOL 0.5 MG/1
0.5 TABLET ORAL DAILY
COMMUNITY
Start: 2024-08-16

## 2024-10-01 RX ORDER — DEXTROAMPHETAMINE SACCHARATE, AMPHETAMINE ASPARTATE, DEXTROAMPHETAMINE SULFATE AND AMPHETAMINE SULFATE 1.25; 1.25; 1.25; 1.25 MG/1; MG/1; MG/1; MG/1
5 TABLET ORAL 3 TIMES WEEKLY
COMMUNITY

## 2024-10-01 ASSESSMENT — PAIN - FUNCTIONAL ASSESSMENT: PAIN_FUNCTIONAL_ASSESSMENT: 0-10

## 2024-10-01 ASSESSMENT — PAIN SCALES - GENERAL: PAINLEVEL_OUTOF10: 0 - NO PAIN

## 2024-10-01 NOTE — DISCHARGE INSTRUCTIONS
Schedule an appointment with your primary care physician in the next 24-48 hours for follow up and continued management of your symptoms.  They can also help to follow-up on the mildly elevated AST and ALT levels to arrange for recheck of the use and potential further testing if needed.  Return to the ED for any new or concerning symptoms including but not limited to severe worsening of your pain, inability to tolrate oral solids or fluids due to worsening pain or severe nausea and vomiting.

## 2024-10-01 NOTE — ED PROVIDER NOTES
HPI   Chief Complaint   Patient presents with    Dizziness     BIB ems for dizziness and blurred vision post meal.        This is a 39-year-old female with a past medical history of GERD, anxiety, bipolar disorder, hyperlipidemia, prediabetes presenting to the ED for evaluation of headache and lightheadedness.  She states that she has been having similar symptoms for over a year, she reports that after she eats she gets very tired to the point where she cannot keep her eyes open, she ate something last night during a night shift and felt the same symptoms but developed some blurred vision and a headache that she describes as a pressure behind her eyes that lasted about an hour before resolving.  She has no symptoms now.  She is currently having a workup done for insulin resistance by her primary care physician who feels that this may be the reason why she gets so tired after eating.  She has no associated chest pain or shortness of breath, abdominal pain during these episodes over last night.      History provided by:  Patient and EMS personnel   used: No            Patient History   Past Medical History:   Diagnosis Date    Anxiety disorder, unspecified 2014    Anxiety    Endometriosis of cervix     Personal history of other mental and behavioral disorders 2014    History of depression    Personal history of other specified conditions 2014    History of headache    Sleep apnea      Past Surgical History:   Procedure Laterality Date    ANTERIOR CRUCIATE LIGAMENT REPAIR Left     cadaver    BLADDER SURGERY  2014    Bladder Surgery     SECTION, CLASSIC  2014     Section    HYSTERECTOMY      TONSILLECTOMY  2014    Tonsillectomy     No family history on file.  Social History     Tobacco Use    Smoking status: Former     Types: Cigarettes    Smokeless tobacco: Never   Vaping Use    Vaping status: Every Day    Start date: 2023    Substances:  Nicotine   Substance Use Topics    Alcohol use: Never     Comment: RARELY 2 X YEAR    Drug use: Never       Physical Exam   ED Triage Vitals [10/01/24 0648]   Temperature Heart Rate Respirations BP   36.5 °C (97.7 °F) 74 18 132/89      Pulse Ox Temp Source Heart Rate Source Patient Position   95 % Temporal Monitor Sitting      BP Location FiO2 (%)     Left arm --       Physical Exam  General: well developed, obese adult female who is awake and alert, oriented x 4, in no apparent distress  Eyes: sclera clear bilaterally, PERRL, EOMI.  Visual fields intact.  HENT: normocephalic, atraumatic. Pharynx without erythema or exudates, uvula midline.  CV: regular rate and rhythm, no murmur, no gallops, or rubs.   Resp: clear to ascultation bilaterally, no wheezes, rales, or rhonchi  GI: abdomen soft, nontender without rigidity or guarding, no peritoneal signs, abdomen is nondistended, no masses palpated  MSK: strength +5/5 to upper and lower extremities bilaterally, no swelling of the extremities.  Neuro: no focal deficits, CN2-12 intact. Sensation fully intact.  NIHSS 0.  Psych: appropriate mood and affect, cooperative with exam  Skin: warm, dry, without evidence of rash or abrasions    ED Course & MDM   ED Course as of 10/03/24 1354   Tue Oct 01, 2024   0655 EKG on my interpretation shows normal sinus rhythm with rate of 71 beats minute.  Normal axis.  QTc is 425 ms, CA interval 150.  There is no ST elevation or depression, no acute ischemic pattern.  No STEMI.  Unremarkable EKG. [NT]      ED Course User Index  [NT] Willis Zamarripa DO         Diagnoses as of 10/03/24 1354   Lightheadedness   Acute nonintractable headache, unspecified headache type   Elevated liver enzymes                 No data recorded     Karel Coma Scale Score: 15 (10/01/24 0650 : Nilda Llanos RN)                           Medical Decision Making  She is in no acute distress on arrival to the ED by EMS, blood pressure 132/89, vitals otherwise  normal.  She has no ongoing symptoms, after this episode of headache and blurred vision will obtain CT to exclude intracranial hemorrhage or evidence of intracranial mass.  Labs ordered as well to assess for alternative etiologies including severe hyperglycemia, she was given IV fluids for initial management.  Her symptoms have since resolved, she has no headache, no blurred vision or any other identifiable symptoms now.  She already has further testing scheduled with her PCP regarding these episodes which have been chronic, occurring daily for over a year.  Today's episode was more severe than usual which is why she is in the ED for further evaluation now.  She has no focal neurologic deficits on exam, NIHSS is 0.  I do not suspect stroke or TIA given the pattern of symptoms occurring after oral intake and lack of any identifiable deficits.      EKG shows no evidence of acute ischemia or arrhythmia, I do not suspect ACS given that she has no associated shortness of breath or chest pain or EKG changes.    Medical review the ED was unremarkable showing no obvious etiology for symptoms.  Head CT was unremarkable as well showing no intracranial hemorrhage or mass or acute pathology.  Results were discussed with the patient.  She was discharged in improved condition with no ongoing symptoms.    CT head wo IV contrast   Final Result   No acute intracranial pathology.        MACRO:   None        Signed by: Sarai Cavanaugh 10/1/2024 8:44 AM   Dictation workstation:   [id number]        Labs Reviewed   CBC WITH AUTO DIFFERENTIAL - Abnormal       Result Value    WBC 7.4      nRBC 0.0      RBC 5.13      Hemoglobin 12.8      Hematocrit 40.3      MCV 79 (*)     MCH 25.0 (*)     MCHC 31.8 (*)     RDW 14.7 (*)     Platelets 349      Neutrophils % 57.1      Immature Granulocytes %, Automated 0.4      Lymphocytes % 32.3      Monocytes % 7.0      Eosinophils % 2.0      Basophils % 1.2      Neutrophils Absolute 4.25      Immature  Granulocytes Absolute, Automated 0.03      Lymphocytes Absolute 2.40      Monocytes Absolute 0.52      Eosinophils Absolute 0.15      Basophils Absolute 0.09     COMPREHENSIVE METABOLIC PANEL - Abnormal    Glucose 120 (*)     Sodium 136      Potassium 4.0      Chloride 105      Bicarbonate 23      Anion Gap 12      Urea Nitrogen 8      Creatinine 0.71      eGFR >90      Calcium 9.7      Albumin 4.1      Alkaline Phosphatase 89      Total Protein 7.5      AST 55 (*)     Bilirubin, Total 0.4      ALT 60 (*)    LIPASE - Normal    Lipase 19      Narrative:     Venipuncture immediately after or during the administration of Metamizole may lead to falsely low results. Testing should be performed immediately prior to Metamizole dosing.   TSH WITH REFLEX TO FREE T4 IF ABNORMAL - Normal    Thyroid Stimulating Hormone 2.78      Narrative:     TSH testing is performed using different testing methodology at [facility] than at other [facility] hospitals. Direct result comparisons should only be made within the same method.           Procedure  Procedures     Willis Zamarripa DO  10/03/24 5153

## 2024-10-01 NOTE — PROGRESS NOTES
Pharmacy Medication History Review    Meaghan Gutierrez is a 39 y.o. female admitted for Dizziness. Pharmacy reviewed the patient's nqqat-ec-ercdducng medications and allergies for accuracy.    The list below reflectives the updated PTA list. Please review each medication in order reconciliation for additional clarification and justification.  Prior to Admission Medications   Prescriptions Last Dose Informant Patient Reported? Taking?   Vraylar 6 mg capsule 10/1/2024 at am  Yes Yes   Sig: Take 1 capsule (6 mg) by mouth once daily.   amphetamine-dextroamphetamine (Adderall) 30 mg tablet Past Week  Yes Yes   Sig: Take 1 tablet (30 mg) by mouth once daily.   amphetamine-dextroamphetamine (Adderall) 5 mg tablet Past Week  Yes Yes   Sig: Take 1 tablet (5 mg) by mouth 3 (three) times a week. Friday, Saturday and Sunday   cetirizine (ZyrTEC) 10 mg tablet 9/30/2024 at am  No Yes   Sig: Take 1 tablet (10 mg) by mouth once daily.   cholecalciferol (Vitamin D-3) 50 MCG (2000 UT) tablet Past Month  No Yes   Sig: Take 1 tablet (2,000 Units) by mouth once daily.   cloNIDine (Catapres) 0.2 mg tablet 10/1/2024 at am  Yes Yes   Sig: Take 1 tablet (0.2 mg) by mouth 3 times a day.   dulaglutide (Trulicity) 0.75 mg/0.5 mL pen injector   No No   Sig: Inject 0.75 mg under the skin 1 (one) time per week.   estradiol (Estrace) 0.5 mg tablet 10/1/2024 at am  Yes Yes   Sig: Take 1 tablet (0.5 mg) by mouth once daily.   gabapentin (Neurontin) 400 mg capsule 10/1/2024 at am  Yes Yes   Sig: Take 1 capsule (400 mg) by mouth 2 times a day.      Facility-Administered Medications: None          The list below reflectives the updated allergy list. Please review each documented allergy for additional clarification and justification.  Allergies  Reviewed by Zahra Schilling CPhT on 10/1/2024        Severity Reactions Comments    Bee Venom Protein (honey Bee) High Anaphylaxis     Latex High Anaphylaxis, Rash blisters    Adhesive Tape-silicones Medium  Other Blisters    Lidocaine Medium Nausea/vomiting     Nickel Medium Swelling Pt reports  wearing silver metal jewelry causes swelling    Penicillins Medium Hives, Rash, Unknown, Nausea/vomiting Other reaction(s): Unknown Reaction    Prednisone Medium Unknown     Procaine Medium Unknown, Nausea/vomiting     Red Dye Medium Rash             Below are additional concerns with the patient's PTA list.  - pt takes two strengths of Adderall, pt takes a total of 35 mg on Fri, Sat and Sunday and Monday-Thursday pt takes 30 mg.    STEPHIE WARREN CPhT

## 2024-10-01 NOTE — Clinical Note
Meaghan Gutierrez was seen and treated in our emergency department on 10/1/2024.  She may return to work on 10/02/2024.       If you have any questions or concerns, please don't hesitate to call.      Willis Zamarripa, DO

## 2024-10-02 LAB
ATRIAL RATE: 71 BPM
P AXIS: 42 DEGREES
P OFFSET: 182 MS
P ONSET: 148 MS
PR INTERVAL: 150 MS
Q ONSET: 223 MS
QRS COUNT: 12 BEATS
QRS DURATION: 96 MS
QT INTERVAL: 392 MS
QTC CALCULATION(BAZETT): 425 MS
QTC FREDERICIA: 414 MS
R AXIS: 0 DEGREES
T AXIS: 15 DEGREES
T OFFSET: 419 MS
VENTRICULAR RATE: 71 BPM

## 2024-10-06 DIAGNOSIS — R73.03 PREDIABETES: ICD-10-CM

## 2024-10-07 RX ORDER — DULAGLUTIDE 0.75 MG/.5ML
0.75 INJECTION, SOLUTION SUBCUTANEOUS
Qty: 4 EACH | Refills: 0 | Status: SHIPPED | OUTPATIENT
Start: 2024-10-13 | End: 2024-11-12

## 2024-11-06 DIAGNOSIS — R73.03 PREDIABETES: ICD-10-CM

## 2024-11-06 RX ORDER — DULAGLUTIDE 0.75 MG/.5ML
0.75 INJECTION, SOLUTION SUBCUTANEOUS
Qty: 4 EACH | Refills: 0 | Status: SHIPPED | OUTPATIENT
Start: 2024-11-10 | End: 2024-12-10

## 2024-11-06 NOTE — TELEPHONE ENCOUNTER
1 month prescription refill sent.  I would like to know how patient is doing on current medication in regards to effectiveness/tolerance.  May be beneficial to further increase dose going forward

## 2024-11-22 ENCOUNTER — TELEPHONE (OUTPATIENT)
Dept: PRIMARY CARE | Facility: CLINIC | Age: 39
End: 2024-11-22
Payer: COMMERCIAL

## 2024-11-22 NOTE — TELEPHONE ENCOUNTER
Noted. With patients symptomatic concerns, it does make sense for a more prompt evaluation. Patient does have orders in for a check of A1c and fasting insulin level. If evaluation is not able to be coordinated before appointment, can plan to have blood work done.

## 2024-11-22 NOTE — TELEPHONE ENCOUNTER
Patient was connected back to me. Patient complained that her blood sugar has been jumping up high hours after she eats. She stated she feels extremely fatigued after she eats and has to take nap after every meal. She has c/o of headaches. She c/o of excessive thirst and excessive urination. I advised patient that she should go to urgent care d/t her symptoms sounding like uncontrolled blood sugar. Explained to patient we do not have a provider in the office till Tuesday. I did attentively schedule patient for Monday December 2nd. Patient is aware and agreeable to go to urgent care to get looked at. Patient will keep us updated.

## 2024-11-23 ENCOUNTER — HOSPITAL ENCOUNTER (EMERGENCY)
Facility: HOSPITAL | Age: 39
Discharge: HOME | End: 2024-11-23
Payer: COMMERCIAL

## 2024-11-23 VITALS
RESPIRATION RATE: 15 BRPM | DIASTOLIC BLOOD PRESSURE: 106 MMHG | OXYGEN SATURATION: 98 % | SYSTOLIC BLOOD PRESSURE: 156 MMHG | HEART RATE: 73 BPM | HEIGHT: 66 IN | BODY MASS INDEX: 45 KG/M2 | TEMPERATURE: 98.6 F | WEIGHT: 280 LBS

## 2024-11-23 DIAGNOSIS — J01.90 ACUTE NON-RECURRENT SINUSITIS, UNSPECIFIED LOCATION: Primary | ICD-10-CM

## 2024-11-23 PROCEDURE — 99282 EMERGENCY DEPT VISIT SF MDM: CPT

## 2024-11-23 PROCEDURE — 2500000001 HC RX 250 WO HCPCS SELF ADMINISTERED DRUGS (ALT 637 FOR MEDICARE OP): Performed by: NURSE PRACTITIONER

## 2024-11-23 RX ORDER — IBUPROFEN 600 MG/1
600 TABLET ORAL EVERY 8 HOURS PRN
Qty: 30 TABLET | Refills: 0 | Status: SHIPPED | OUTPATIENT
Start: 2024-11-23

## 2024-11-23 RX ORDER — ACETAMINOPHEN 325 MG/1
650 TABLET ORAL ONCE
Status: COMPLETED | OUTPATIENT
Start: 2024-11-23 | End: 2024-11-23

## 2024-11-23 RX ORDER — IBUPROFEN 600 MG/1
600 TABLET ORAL ONCE
Status: COMPLETED | OUTPATIENT
Start: 2024-11-23 | End: 2024-11-23

## 2024-11-23 RX ORDER — GUAIFENESIN, PSEUDOEPHEDRINE HYDROCHLORIDE 600; 60 MG/1; MG/1
1 TABLET, EXTENDED RELEASE ORAL EVERY 12 HOURS
Qty: 14 TABLET | Refills: 0 | Status: SHIPPED | OUTPATIENT
Start: 2024-11-23 | End: 2024-11-30

## 2024-11-23 RX ORDER — FLUTICASONE PROPIONATE 50 MCG
1 SPRAY, SUSPENSION (ML) NASAL DAILY
Qty: 16 G | Refills: 0 | Status: SHIPPED | OUTPATIENT
Start: 2024-11-23 | End: 2024-12-23

## 2024-11-23 RX ADMIN — ACETAMINOPHEN 650 MG: 325 TABLET, FILM COATED ORAL at 21:30

## 2024-11-23 RX ADMIN — IBUPROFEN 600 MG: 600 TABLET ORAL at 21:30

## 2024-11-23 RX ADMIN — GUAIFENESIN AND DEXTROMETHORPHAN HYDROBROMIDE 1 TABLET: 600; 30 TABLET, EXTENDED RELEASE ORAL at 21:30

## 2024-11-23 ASSESSMENT — COLUMBIA-SUICIDE SEVERITY RATING SCALE - C-SSRS
2. HAVE YOU ACTUALLY HAD ANY THOUGHTS OF KILLING YOURSELF?: NO
6. HAVE YOU EVER DONE ANYTHING, STARTED TO DO ANYTHING, OR PREPARED TO DO ANYTHING TO END YOUR LIFE?: NO
1. IN THE PAST MONTH, HAVE YOU WISHED YOU WERE DEAD OR WISHED YOU COULD GO TO SLEEP AND NOT WAKE UP?: NO

## 2024-11-23 ASSESSMENT — PAIN SCALES - GENERAL: PAINLEVEL_OUTOF10: 5 - MODERATE PAIN

## 2024-11-23 ASSESSMENT — PAIN - FUNCTIONAL ASSESSMENT: PAIN_FUNCTIONAL_ASSESSMENT: 0-10

## 2024-11-23 NOTE — LETTER
November 23, 2024    Patient: Meaghan Gutierrez   YOB: 1985   Date of Visit: 11/23/2024       To Whom It May Concern:    Meaghan Gutierrez was seen and treated in our emergency department on 11/23/2024. She able to return back to work on 11/25/2024.     If you have any questions or concerns, please don't hesitate to call.       Sean Watkins CNP    CC: No Recipients

## 2024-11-24 NOTE — ED PROVIDER NOTES
HPI   Chief Complaint   Patient presents with    Headache     Pt presents to the ED with c.c of headache that started 3 days ago after started a new med for her diabetes. Pt states her sugars have been higher than normal as well.        HPI  See my MDM      Patient History   Past Medical History:   Diagnosis Date    Anxiety disorder, unspecified 2014    Anxiety    Endometriosis of cervix     Personal history of other mental and behavioral disorders 2014    History of depression    Personal history of other specified conditions 2014    History of headache    Sleep apnea      Past Surgical History:   Procedure Laterality Date    ANTERIOR CRUCIATE LIGAMENT REPAIR Left     cadaver    BLADDER SURGERY  2014    Bladder Surgery     SECTION, CLASSIC  2014     Section    HYSTERECTOMY      TONSILLECTOMY  2014    Tonsillectomy     No family history on file.  Social History     Tobacco Use    Smoking status: Former     Types: Cigarettes    Smokeless tobacco: Never   Vaping Use    Vaping status: Every Day    Start date: 2023    Substances: Nicotine   Substance Use Topics    Alcohol use: Never     Comment: RARELY 2 X YEAR    Drug use: Never       Physical Exam   ED Triage Vitals [24]   Temperature Heart Rate Respirations BP   37 °C (98.6 °F) 73 15 (!) 156/106      Pulse Ox Temp Source Heart Rate Source Patient Position   98 % Temporal Monitor Sitting      BP Location FiO2 (%)     Left arm --       Physical Exam  CONSTITUTIONAL: Vital signs reviewed as charted, well-developed and in no distress  Eyes: Extraocular muscles are intact. Pupils equal round and reactive to light. Conjunctiva are pink.    ENT: Mucous membranes are moist. Tongue in the midline. Pharynx with erythema but no edema or exudates., uvula midline.  Bilateral tympanic membranes show serous fluid present behind and without erythema or edema of the canal.  Nasal turbinates are red and  inflamed  LUNGS: Breath sounds equal and clear to auscultation. Good air exchange, no wheezes rales or retractions, pulse oximetry is charted.  HEART: Regular rate and rhythm without murmur thrill or rub, strong tones, auscultation is normal.  ABDOMEN: Soft and nontender without guarding rebound rigidity or mass. Bowel sounds are present and normal in all quadrants. There is no palpable masses or aneurysms identified. No hepatosplenomegaly, normal abdominal exam.  Neuro: The patient is awake, alert and oriented ×3. Moving all 4 extremities and answering questions appropriately.   MUSCULOSKELETAL: The calves are nontender to palpation. Full gross active range of motion.   PSYCH: Awake alert oriented, normal mood and affect.  Skin:  Dry, normal color, warm to the touch, no rash present.        ED Course & MDM   Diagnoses as of 11/23/24 2131   Acute non-recurrent sinusitis, unspecified location                 No data recorded     Karel Coma Scale Score: 15 (11/23/24 2052 : Kath Martinez, EMT)                           Medical Decision Making  History obtained from: patient    Vital signs, nursing notes, current medications, past medical history, Surgical history, allergies, social history, family History were reviewed.         HPI:  Patient 39-year-old female known history of diabetes mellitus presenting to ED today complaining of headache above and behind her eyes pressure in her ears ongoing for the last 3 days.  States her blood sugars have also been running higher than normal up around 200.  Denies fevers.  Denies dizziness, chest pain, shortness of breath, abdominal pain or extremity edema.  States has been nauseous but no vomiting or diarrhea.  Denies recent sick contacts or travel      10 point ROS was reviewed and negative except Noted above in HPI.  DDX: as listed above          MDM Summary/considerations:  Labs Reviewed - No data to display  No orders to display     Medications   acetaminophen (Tylenol)  tablet 650 mg (650 mg oral Given 11/23/24 2130)   ibuprofen tablet 600 mg (600 mg oral Given 11/23/24 2130)   dextromethorphan-guaifenesin (Mucinex DM)  mg per 12 hr tablet 1 tablet (1 tablet oral Given 11/23/24 2130)     New Prescriptions    FLUTICASONE (FLONASE) 50 MCG/ACTUATION NASAL SPRAY    Administer 1 spray into each nostril once daily. Shake gently. Before first use, prime pump. After use, clean tip and replace cap.    IBUPROFEN 600 MG TABLET    Take 1 tablet (600 mg) by mouth every 8 hours if needed for mild pain (1 - 3) or fever (temp greater than 38.0 C).    PSEUDOEPHEDRINE-GUAIFENESIN (MUCINEX D)  MG 12 HR TABLET    Take 1 tablet by mouth every 12 hours for 7 days. Do not crush, chew, or split.     I estimate there is LOW risk for EPIGLOTTITIS, PNEUMONIA, MENINGITIS, OR URINARY TRACT INFECTION, thus I consider the discharge disposition reasonable. Also, there is no evidence for peritonitis, sepsis, or toxicity. We have discussed the diagnosis and risks, and we agree with discharging home to follow-up with their primary doctor. We also discussed returning to the Emergency Department immediately if new or worsening symptoms occur. We have discussed the symptoms which are most concerning (e.g., changing or worsening pain, trouble swallowing or breathing, neck stiffness, fever) that necessitate immediate return.    Examination consistent with sinusitis discussed symptomatic treatment.  Offered COVID-19 influenza testing.  Patient discharged home in stable condition will follow with PCP for reevaluation.    All of the patient's questions were answered to the best of my ability.  Patient states understanding that they have been screened for an emergency today and we have not found any etiology of symptoms that requires emergent treatment or admission to the hospital at this point. They understand that they have not had definitive care day and require follow-up for treatment of their condition. They  also state understanding that they may have an emergent condition that may potentially have not of detected at this visit and they must return to the emergency department if they develop any worsening of symptoms or new complaints.      I have evaluated this patient, my supervising physician was available for consultation.              Critical Care: Not warranted at this time        This chart was completed using voice recognition transcription software. Please excuse any errors of transcription including grammatical, punctuation, syntax and spelling errors.  Please contact me with any questions regarding this chart.    Procedure  Procedures     PAYAM Steinberg-CNP  11/23/24 8016

## 2024-11-24 NOTE — ED NOTES
Pt to ED from home, c/o headache with sinus pressure radiating into the ears. States symptoms x 3 days. Did go to the urgent care a couple of days ago, but states they only checked her blood sugar.     D.c. papers given. Scrips x 3 sent to pharmacy. Pt advised on oral water hydration. Follow up with PCP. Return should symptoms change/worsen in anyway. Gait steady to lobby. Home with spouse.      Gumaro Russo RN  11/23/24 0819

## 2024-12-02 ENCOUNTER — OFFICE VISIT (OUTPATIENT)
Dept: PRIMARY CARE | Facility: CLINIC | Age: 39
End: 2024-12-02
Payer: COMMERCIAL

## 2024-12-02 VITALS
SYSTOLIC BLOOD PRESSURE: 106 MMHG | HEART RATE: 91 BPM | DIASTOLIC BLOOD PRESSURE: 74 MMHG | TEMPERATURE: 96.8 F | WEIGHT: 279 LBS | OXYGEN SATURATION: 98 % | BODY MASS INDEX: 44.84 KG/M2 | HEIGHT: 66 IN

## 2024-12-02 DIAGNOSIS — R73.9 HYPERGLYCEMIA: ICD-10-CM

## 2024-12-02 DIAGNOSIS — R73.03 PREDIABETES: Primary | ICD-10-CM

## 2024-12-02 DIAGNOSIS — R53.83 FATIGUE, UNSPECIFIED TYPE: ICD-10-CM

## 2024-12-02 PROCEDURE — 1036F TOBACCO NON-USER: CPT | Performed by: FAMILY MEDICINE

## 2024-12-02 PROCEDURE — 99214 OFFICE O/P EST MOD 30 MIN: CPT | Performed by: FAMILY MEDICINE

## 2024-12-02 PROCEDURE — 3008F BODY MASS INDEX DOCD: CPT | Performed by: FAMILY MEDICINE

## 2024-12-02 ASSESSMENT — PATIENT HEALTH QUESTIONNAIRE - PHQ9
2. FEELING DOWN, DEPRESSED OR HOPELESS: NOT AT ALL
1. LITTLE INTEREST OR PLEASURE IN DOING THINGS: NOT AT ALL
SUM OF ALL RESPONSES TO PHQ9 QUESTIONS 1 AND 2: 0

## 2024-12-02 ASSESSMENT — PAIN SCALES - GENERAL: PAINLEVEL_OUTOF10: 0-NO PAIN

## 2024-12-02 NOTE — ASSESSMENT & PLAN NOTE
- Will check A1c sugar average with previous sugars bordering edge of diabetes  -With ongoing symptoms we will plan for formal evaluation with endocrinology and dietitian  -Will closely monitor response to next Trulicity dose  -FMLA paperwork to be completed

## 2024-12-02 NOTE — PATIENT INSTRUCTIONS
Problem List Items Addressed This Visit             ICD-10-CM    Fatigue R53.83     - Will check sugar average and insulin level to see if there is any contributing metabolic deficiencies relating to fatigue episodes         Relevant Orders    Referral to Endocrinology    Referral to Nutrition Services    Prediabetes - Primary R73.03     - Will check A1c sugar average with previous sugars bordering edge of diabetes  -With ongoing symptoms we will plan for formal evaluation with endocrinology and dietitian  -Will closely monitor response to next Trulicity dose  -FMLA paperwork to be completed         Relevant Orders    Referral to Endocrinology    Referral to Nutrition Services    Hyperglycemia R73.9    Relevant Orders    Referral to Endocrinology    Referral to Nutrition Services           Counseling:       Medication education:         Education:  The patient is counseled regarding potential side-effects of all new medications        Understanding:  Patient expressed understanding        Adherence:  No barriers to adherence identified

## 2024-12-02 NOTE — PROGRESS NOTES
Outpatient Visit Note    Chief Complaint   Patient presents with    Follow-up     Getting fatiqued after eating with sugar and BP fluctuating         HPI:  Meaghan Gutierrez is a 39 y.o. female who presents to the office for ER follow-up.  Patient was last seen on 9/30/2024 secondary to fatigue concerns.     In interval, patient did undergo hysterectomy.  They have otherwise been following regularly with established mental health team at [facility].    Past medical history significant for prediabetes with last A1c sugar average completed in April 2024 which was 6.4%.  They report concerns that sugars have been running high recently, reporting progressive fatigue.  Denies any overt polyuria, polydipsia or polyphagia.  No reported acute vision/sensation changes.    Panel blood work was completed 4/11/24 including A1c, vitamin D, CMP and CBC which was remarkable for hyperglycemia with prediabetic A1c of 6.4% and persistent vitamin D deficiency.  Supplementation regiment was initiated with repeat level checked on 7/16/2024 which had normalized.  Patient had subsequent blood work done for preoperative testing in July which was generally unremarkable.    At last encounter they reported intermittent episodes of chronic fatigue over the last year that is worsened after eating.  Stated that if they went without eating, she often feels lightheaded.  Noted that the symptoms have been become progressively worse over the course of the last several months.  Blood work was ultimately ordered though not completed.    Patient presented to the emergency department on 10/1/2024 secondary to complaints of dizziness with the postprandial blurred vision.  Reiterated similar symptoms over the past year as noted in office visit.  He described headache with pressure behind eyes which lasted about an hour before self resolving.  Had no active symptoms at time of evaluation.  Denied any associated chest pain, shortness of  breath or abdominal pain.  EKG showed normal sinus rhythm with stable vitals.  Labs showed hyperglycemia with elevated liver enzymes.  Kidney, lipase and thyroid levels were normal.  No leukocytosis appreciated.  CT head was completed which was negative.  Patient was ultimately treated with IV fluids and discharged in stable condition.    Patient contacted office regarding fluctuating blood sugars on 11/22/2024.  She was encouraged to complete blood work at earliest convenience.    They recently presented to the emergency department on 11/23/2024 secondary to complaints of headache x 3 days.  Stated to have been started on new medication for diabetes with reports of blood glucose levels around 200.  Denied fever, dizziness, chest pain, shortness of breath, abdominal pain or lower extremity edema.  Had nausea with no vomiting or diarrhea.  Denied any sick contacts.  COVID-19 and influenza testing were completed and negative.  Blood pressure was notably elevated without overt history of hypertension.  Patient was ultimately diagnosed with a sinus infection to which supportive care was encouraged including Flonase, ibuprofen and Mucinex.    Today they report continued fluctuations with blood glucose levels with fatigue, worse after eating.  Continues to have prominent fatigue after eating which often renata her from trying to eat food at work.  Continues to have fluctuating blood sugars ranging from 80s-200.  Has created problems functioning at work where she has been sent multiple times to the ER as noted above.  Continues to have intermittent dizziness and headaches.  States that headaches have been more prominent 1 to 2 days after Trulicity, to which she has taken a total of 2 doses.  Does attempt to moderate her diet though this is difficult.  Was given endocrinology referral though soonest availability was not until March.  Would be interested in having conversation with dietitian.    Current Medications  Current  Outpatient Medications   Medication Instructions    amphetamine-dextroamphetamine (Adderall) 30 mg tablet 30 mg, Daily    amphetamine-dextroamphetamine (Adderall) 5 mg tablet 5 mg, 3 times weekly    cetirizine (ZYRTEC) 10 mg, oral, Daily    cholecalciferol (VITAMIN D-3) 2,000 Units, oral, Daily    cloNIDine (CATAPRES) 0.2 mg, 3 times daily    estradiol (ESTRACE) 0.5 mg, Daily    fluticasone (Flonase) 50 mcg/actuation nasal spray 1 spray, Each Nostril, Daily, Shake gently. Before first use, prime pump. After use, clean tip and replace cap.    gabapentin (NEURONTIN) 400 mg, 2 times daily    ibuprofen 600 mg, oral, Every 8 hours PRN    Trulicity 0.75 mg, subcutaneous, Once Weekly    Vraylar 6 mg, Daily        Allergies  Allergies   Allergen Reactions    Bee Venom Protein (Honey Bee) Anaphylaxis    Latex Anaphylaxis and Rash     blisters    Adhesive Tape-Silicones Other     Blisters    Lidocaine Nausea/vomiting    Nickel Swelling     Pt reports  wearing silver metal jewelry causes swelling    Penicillins Hives, Rash, Unknown and Nausea/vomiting     Other reaction(s): Unknown Reaction    Prednisone Unknown    Procaine Unknown and Nausea/vomiting    Red Dye Rash        Past Medical History:   Diagnosis Date    Anxiety disorder, unspecified 2014    Anxiety    Endometriosis of cervix     Personal history of other mental and behavioral disorders 2014    History of depression    Personal history of other specified conditions 2014    History of headache    Sleep apnea       Past Surgical History:   Procedure Laterality Date    ANTERIOR CRUCIATE LIGAMENT REPAIR Left     cadaver    BLADDER SURGERY  2014    Bladder Surgery     SECTION, CLASSIC  2014     Section    HYSTERECTOMY      TONSILLECTOMY  2014    Tonsillectomy     No family history on file.  Social History     Tobacco Use    Smoking status: Former     Types: Cigarettes    Smokeless tobacco: Never   Vaping Use    Vaping  status: Every Day    Start date: 7/16/2023    Substances: Nicotine   Substance Use Topics    Alcohol use: Never     Comment: RARELY 2 X YEAR    Drug use: Never       ROS  All pertinent positive symptoms are included in the history of present illness.  All other systems have been reviewed and are negative and noncontributory to this patient's current ailments.    VITAL SIGNS  Vitals:    12/02/24 0914   BP: 106/74   Pulse: 91   Temp: 36 °C (96.8 °F)   SpO2: 98%           PHYSICAL EXAM  GENERAL APPEARANCE: alert and oriented, Pleasant and cooperative, No Acute Distress  HEENT: EOMI, PERRLA, MMM  EXTREMITIES: no edema, normal ROM  SKIN: normal, no rash, unremarkable  NEUROLOGIC EXAM: non-focal exam  MUSCULOSKELETAL: no gross abnormalities  PSYCH: affect is normal, eye contact is good    Assessment/Plan   Problem List Items Addressed This Visit             ICD-10-CM    Fatigue R53.83     - Will check sugar average and insulin level to see if there is any contributing metabolic deficiencies relating to fatigue episodes         Relevant Orders    Referral to Endocrinology    Referral to Nutrition Services    Prediabetes - Primary R73.03     - Will check A1c sugar average with previous sugars bordering edge of diabetes  -With ongoing symptoms we will plan for formal evaluation with endocrinology and dietitian  -Will closely monitor response to next Trulicity dose  -FMLA paperwork to be completed         Relevant Orders    Referral to Endocrinology    Referral to Nutrition Services    Hyperglycemia R73.9    Relevant Orders    Referral to Endocrinology    Referral to Nutrition Services           Counseling:       Medication education:         Education:  The patient is counseled regarding potential side-effects of all new medications        Understanding:  Patient expressed understanding        Adherence:  No barriers to adherence identified

## 2024-12-09 ENCOUNTER — APPOINTMENT (OUTPATIENT)
Dept: SLEEP MEDICINE | Facility: CLINIC | Age: 39
End: 2024-12-09
Payer: COMMERCIAL

## 2024-12-09 ENCOUNTER — TELEPHONE (OUTPATIENT)
Dept: PRIMARY CARE | Facility: CLINIC | Age: 39
End: 2024-12-09

## 2024-12-09 VITALS
DIASTOLIC BLOOD PRESSURE: 68 MMHG | OXYGEN SATURATION: 97 % | HEIGHT: 66 IN | BODY MASS INDEX: 44.68 KG/M2 | SYSTOLIC BLOOD PRESSURE: 106 MMHG | WEIGHT: 278 LBS | HEART RATE: 77 BPM

## 2024-12-09 DIAGNOSIS — G47.33 OSA (OBSTRUCTIVE SLEEP APNEA): Primary | ICD-10-CM

## 2024-12-09 DIAGNOSIS — E83.10 DISORDER OF IRON METABOLISM: ICD-10-CM

## 2024-12-09 PROCEDURE — 1036F TOBACCO NON-USER: CPT | Performed by: INTERNAL MEDICINE

## 2024-12-09 PROCEDURE — 3008F BODY MASS INDEX DOCD: CPT | Performed by: INTERNAL MEDICINE

## 2024-12-09 PROCEDURE — 99214 OFFICE O/P EST MOD 30 MIN: CPT | Performed by: INTERNAL MEDICINE

## 2024-12-09 ASSESSMENT — SLEEP AND FATIGUE QUESTIONNAIRES
HOW LIKELY ARE YOU TO NOD OFF OR FALL ASLEEP IN A CAR, WHILE STOPPED FOR A FEW MINUTES IN TRAFFIC: WOULD NEVER DOZE
HOW LIKELY ARE YOU TO NOD OFF OR FALL ASLEEP WHILE LYING DOWN TO REST IN THE AFTERNOON WHEN CIRCUMSTANCES PERMIT: HIGH CHANCE OF DOZING
HOW LIKELY ARE YOU TO NOD OFF OR FALL ASLEEP WHEN YOU ARE A PASSENGER IN A CAR FOR AN HOUR WITHOUT A BREAK: HIGH CHANCE OF DOZING
HOW LIKELY ARE YOU TO NOD OFF OR FALL ASLEEP WHILE SITTING AND TALKING TO SOMEONE: SLIGHT CHANCE OF DOZING
HOW LIKELY ARE YOU TO NOD OFF OR FALL ASLEEP WHILE SITTING AND READING: HIGH CHANCE OF DOZING
HOW LIKELY ARE YOU TO NOD OFF OR FALL ASLEEP WHILE SITTING QUIETLY AFTER LUNCH WITHOUT ALCOHOL: HIGH CHANCE OF DOZING
ESS-CHAD TOTAL SCORE: 18
HOW LIKELY ARE YOU TO NOD OFF OR FALL ASLEEP WHILE WATCHING TV: HIGH CHANCE OF DOZING
SITING INACTIVE IN A PUBLIC PLACE LIKE A CLASS ROOM OR A MOVIE THEATER: MODERATE CHANCE OF DOZING

## 2024-12-09 ASSESSMENT — PAIN SCALES - GENERAL: PAINLEVEL_OUTOF10: 0-NO PAIN

## 2024-12-09 NOTE — PROGRESS NOTES
Patient: Danitza Gutierrez    [id number]  : 1985 -- AGE 39 y.o.    Provider: Demetri Jeffery MD     Location [facility]   Service Date: 2024              [facility]  Followup Visit Note    Subjective   Patient ID: Danitza Gutierrez is a 39 y.o. female who presents for Sleep Study (To order ).  HPI    Prior Sleep History:  SLEEP DISORDERED BREATHING:  - DANITZA likely has sleep apnea based on the the history and physical examination and would benefit from further evaluation through sleep testing.  - In lab sleep testing is recommended for DANITZA  -We will discuss treatment options when testing is complete.     PROBABLE RESTLESS LEG SYNDROME: This occurs frequently.  - We will check a ferritin level today and start OTC iron replacement to ferritin of >75 as indicated.     OBESITY with a BMI of 40.35  - We discussed the importance of weight loss in the management of obstructive sleep apnea to reduce severity and in some cases resolve it altogether.  - Handout was provided to DANITZA which provides education on weight loss  - Referral to weight management was offered to DANITZA.  - DANITZA verbalized understanding      EXCESSIVE DAYTIME SLEEPINESS:- suspect to be multifactorial including sleep apnea, RLS, shift work and possibly other hypersomnolence disorder at this time      SHIFT WORK SLEEP DISORDER-   - Likely contributing to worsening sleepiness compounded by comorbid sleep disorders and her currently not maintaining consistent sleep schedule on days off     She is not using her CPAP    Current Sleep History:  Danitza presents for follow-up on the management of her sleep apnea. She never had the sleep study. Moved and did not get it completed. Is here to get her sleep study completed.    RLS symptoms, intermittent    ESS: 18     Review of Systems  Review of systems negative except as per HPI  Objective   /68   Pulse 77    "Ht 1.676 m (5' 6\")   Wt 126 kg (278 lb)   LMP 07/19/2024 (Exact Date)   SpO2 97%   BMI 44.87 kg/m²    PREVIOUS WEIGHTS:  Wt Readings from Last 3 Encounters:   12/09/24 126 kg (278 lb)   12/02/24 127 kg (279 lb)   11/23/24 127 kg (280 lb)       Physical Exam  PHYSICAL EXAM: GENERAL: alert pleasant and cooperative no acute distress  PSYCH EXAM: alert,oriented, in NAD with a full range of affect, normal behavior and no psychotic features    No results found for: \"IRON\", \"TIBC\", \"FERRITIN\"     Assessment/Plan   Problem List Items Addressed This Visit             ICD-10-CM    LIANA (obstructive sleep apnea) - Primary G47.33    Relevant Orders    In-Center Sleep Study (Sleep Provider Only)    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

## 2024-12-09 NOTE — TELEPHONE ENCOUNTER
Meaghan emailed [facility] paperwork to our office on today. This was printed out and put in the Dr's mailbox today (2 pages). Please fax to [phone] ASAP.

## 2024-12-09 NOTE — PATIENT INSTRUCTIONS
Call  the  Sleep Center ([phone]) to speak with a sleep testing center  to book your overnight sleep study procedure at one of our adult and pediatric-friendly sleep labs. Overnight sleep studies may be scheduled on a weekday or weekend.      We have child-life services on a case-by-case basis at the [facility] location. We also perform daytime testing for shift workers on a case by case basis.     For the study: Bring usual medications and nightly routine items for your sleep study.  You may wish to bring a snack and nightly routine items you feel would be important to help you sleep (e.g. favorite pillow). Bring your sleep logs/requested paperwork to your sleep study appointment.     Results of your sleep study will be given to the ordering clinician. Please contact their office for results or followup as directed by your clinician. For additional information about the sleep medicine services, please call [phone].     Locations for sleep studies are [facility], [place], [place], [place], [place], [place], and [place].

## 2024-12-11 ENCOUNTER — TELEPHONE (OUTPATIENT)
Dept: ENDOCRINOLOGY | Facility: CLINIC | Age: 39
End: 2024-12-11
Payer: COMMERCIAL

## 2024-12-11 NOTE — TELEPHONE ENCOUNTER
Meaghan Gutierrez   1985   [id number]   [phone]       Called and spoke to patient in regards to scheduling NP appt for 12/16/24.

## 2024-12-12 ENCOUNTER — OFFICE VISIT (OUTPATIENT)
Dept: PRIMARY CARE | Facility: CLINIC | Age: 39
End: 2024-12-12
Payer: COMMERCIAL

## 2024-12-12 VITALS
SYSTOLIC BLOOD PRESSURE: 114 MMHG | HEART RATE: 86 BPM | TEMPERATURE: 97.1 F | HEIGHT: 66 IN | WEIGHT: 280 LBS | DIASTOLIC BLOOD PRESSURE: 68 MMHG | OXYGEN SATURATION: 98 % | BODY MASS INDEX: 45 KG/M2

## 2024-12-12 DIAGNOSIS — J01.00 ACUTE NON-RECURRENT MAXILLARY SINUSITIS: Primary | ICD-10-CM

## 2024-12-12 PROCEDURE — 3008F BODY MASS INDEX DOCD: CPT | Performed by: FAMILY MEDICINE

## 2024-12-12 PROCEDURE — 99214 OFFICE O/P EST MOD 30 MIN: CPT | Performed by: FAMILY MEDICINE

## 2024-12-12 RX ORDER — AZITHROMYCIN 250 MG/1
TABLET, FILM COATED ORAL
Qty: 6 TABLET | Refills: 0 | Status: SHIPPED | OUTPATIENT
Start: 2024-12-12 | End: 2024-12-17

## 2024-12-12 ASSESSMENT — PAIN SCALES - GENERAL: PAINLEVEL_OUTOF10: 4

## 2024-12-12 NOTE — PROGRESS NOTES
Outpatient Visit Note    Chief Complaint   Patient presents with    Nasal Congestion     Nasal pressure, HA, nausea x1 week.         HPI:  Meaghan Gutierrez is a 39 y.o. female who presents to the office secondary to concerns for possible sinus infection.  She was recently seen in the office on 12/2/2024 for ER follow-up.     They report this and progressive frontal and maxillary sinus pressure for the last week with associated headaches and nausea.  Has continued to have lightheadedness/fatigue issues which seem to be more sensitive with recent illness.  Has been utilizing Mucinex which was previously prescribed in the emergency department.  Was providing some relief but denies any significant improvement in time.  No reports of fever/chills, sore throat, vomiting or diarrhea.    Of note, patient has endocrinology appointment scheduled for 12/16/2024.    Current Medications  Current Outpatient Medications   Medication Instructions    amphetamine-dextroamphetamine (Adderall) 30 mg tablet 30 mg, Daily    amphetamine-dextroamphetamine (Adderall) 5 mg tablet 5 mg, 3 times weekly    azithromycin (Zithromax) 250 mg tablet Take 2 tablets (500 mg) by mouth once daily for 1 day, THEN 1 tablet (250 mg) once daily for 4 days. Take 2 tabs (500 mg) by mouth today, than 1 daily for 4 days..    cetirizine (ZYRTEC) 10 mg, oral, Daily    cholecalciferol (VITAMIN D-3) 2,000 Units, oral, Daily    cloNIDine (CATAPRES) 0.2 mg, 3 times daily    estradiol (ESTRACE) 0.5 mg, Daily    gabapentin (NEURONTIN) 400 mg, 2 times daily    ibuprofen 600 mg, oral, Every 8 hours PRN    Trulicity 0.75 mg, subcutaneous, Once Weekly    Vraylar 6 mg, Daily        Allergies  Allergies   Allergen Reactions    Bee Venom Protein (Honey Bee) Anaphylaxis    Latex Anaphylaxis and Rash     blisters    Adhesive Tape-Silicones Other     Blisters    Lidocaine Nausea/vomiting    Nickel Swelling     Pt reports  wearing silver metal jewelry causes swelling     Penicillins Hives, Rash, Unknown and Nausea/vomiting     Other reaction(s): Unknown Reaction    Prednisone Unknown    Procaine Unknown and Nausea/vomiting    Red Dye Rash        Past Medical History:   Diagnosis Date    Anxiety disorder, unspecified 2014    Anxiety    Endometriosis of cervix     Personal history of other mental and behavioral disorders 2014    History of depression    Personal history of other specified conditions 2014    History of headache    Sleep apnea       Past Surgical History:   Procedure Laterality Date    ANTERIOR CRUCIATE LIGAMENT REPAIR Left     cadaver    BLADDER SURGERY  2014    Bladder Surgery     SECTION, CLASSIC  2014     Section    HYSTERECTOMY      TONSILLECTOMY  2014    Tonsillectomy     No family history on file.  Social History     Tobacco Use    Smoking status: Former     Types: Cigarettes    Smokeless tobacco: Never   Vaping Use    Vaping status: Every Day    Start date: 2023    Substances: Nicotine   Substance Use Topics    Alcohol use: Never     Comment: RARELY 2 X YEAR    Drug use: Never       ROS  All pertinent positive symptoms are included in the history of present illness.  All other systems have been reviewed and are negative and noncontributory to this patient's current ailments.    VITAL SIGNS  Vitals:    24 1506   BP: 114/68   Pulse: 86   Temp: 36.2 °C (97.1 °F)   SpO2: 98%       PHYSICAL EXAM  GENERAL APPEARANCE: alert and oriented, Pleasant and cooperative, No Acute Distress  HEENT: EOMI, PERRLA, tympanic membranes clear and flat bilaterally, nose clear, Oropharynx clear with MMM  HEART: RRR, normal S1S2, no murmurs, click or rubs  LUNGS: clear to auscultation bilaterally, no wheezes/rhonchi/rales  EXTREMITIES: no edema, normal ROM  SKIN: normal, no rash, unremarkable  NEUROLOGIC EXAM: non-focal exam  MUSCULOSKELETAL: no gross abnormalities  PSYCH: affect is normal, eye contact is  good    Assessment/Plan   Problem List Items Addressed This Visit             ICD-10-CM    Acute non-recurrent maxillary sinusitis - Primary J01.00     - Given your symptoms and duration of illness, we feel that you can benefit from antibiotic coverage at this time   - A prescription for azithromycin was sent to your pharmacy, please take this medication as prescribed  -Nasacort additionally encouraged secondary to sinus pressure complaints   - Recommend supportive care with increased fluid intake to thin secretions, Ibuprofen or Tylenol as needed for pain or fever, and steamy showers/saline nasal rinses to help clear the nasal passages   - You may consider tea with honey or a cinnamon stick as these have natural antiviral and antibiotic properties   - Call if symptoms worsen or do not improve with these treatments         Relevant Medications    azithromycin (Zithromax) 250 mg tablet       Counseling:       Medication education:         Education:  The patient is counseled regarding potential side-effects of all new medications        Understanding:  Patient expressed understanding        Adherence:  No barriers to adherence identified

## 2024-12-12 NOTE — PROGRESS NOTES
HPI   38 yo presents as new pt for metabolic management.  Pt with dm2 (dx dec 2024), mira (getting evaluated for cpap), depression/anxiety, obesity (280 lbs highest wt).  A1c-6.9% today, was 6.4% spring 2024.    Pt is testing sugars <1/day, when testing 80's on waking, after eating 170-240's.  Pt is following carb controlled diet.  Not as active since knee surgery.    Taking trulicity 0.75mg weekly (for the past month).  -having headaches/dizziness/nauseated for 2 days after trulicity shot  -had tried metformin in the past-question of diarrhea with it    -has struggled with lifestyle to lose wt, had been study in 230 lb range since  knee surgery and less active job      Past Medical History:   Diagnosis Date    Anxiety disorder, unspecified 2014     Anxiety    Endometriosis of cervix      Personal history of other mental and behavioral disorders 2014     History of depression    Personal history of other specified conditions 2014     History of headache    Sleep apnea           Surgical History         Past Surgical History:   Procedure Laterality Date    ANTERIOR CRUCIATE LIGAMENT REPAIR Left       cadaver    BLADDER SURGERY   2014     Bladder Surgery     SECTION, CLASSIC   2014      Section    HYSTERECTOMY        TONSILLECTOMY   2014     Tonsillectomy         SH: previous tobacco, + vaping, rare alcohol    FH:  Mom-depression  Dad-cancer, dm2  Brother-healthy    Current Outpatient Medications:     amphetamine-dextroamphetamine (Adderall) 30 mg tablet, Take 1 tablet (30 mg) by mouth once daily., Disp: , Rfl:     amphetamine-dextroamphetamine (Adderall) 5 mg tablet, Take 1 tablet (5 mg) by mouth 3 (three) times a week. Friday, Saturday and , Disp: , Rfl:     azithromycin (Zithromax) 250 mg tablet, Take 2 tablets (500 mg) by mouth once daily for 1 day, THEN 1 tablet (250 mg) once daily for 4 days. Take 2 tabs (500 mg) by mouth today, than 1 daily for 4 days..,  Disp: 6 tablet, Rfl: 0    cholecalciferol (Vitamin D-3) 50 MCG (2000 UT) tablet, Take 1 tablet (2,000 Units) by mouth once daily., Disp: 90 tablet, Rfl: 3    cloNIDine (Catapres) 0.2 mg tablet, Take 1 tablet (0.2 mg) by mouth 3 times a day., Disp: , Rfl:     estradiol (Estrace) 0.5 mg tablet, Take 1 tablet (0.5 mg) by mouth once daily., Disp: , Rfl:     gabapentin (Neurontin) 400 mg capsule, Take 1 capsule (400 mg) by mouth 2 times a day., Disp: , Rfl:     ibuprofen 600 mg tablet, Take 1 tablet (600 mg) by mouth every 8 hours if needed for mild pain (1 - 3) or fever (temp greater than 38.0 C)., Disp: 30 tablet, Rfl: 0    valACYclovir (Valtrex) 1 gram tablet, Take 1 tablet (1,000 mg) by mouth once daily., Disp: , Rfl:     Vraylar 6 mg capsule, Take 1 capsule (6 mg) by mouth once daily., Disp: , Rfl:     cetirizine (ZyrTEC) 10 mg tablet, Take 1 tablet (10 mg) by mouth once daily. (Patient not taking: Reported on 12/16/2024), Disp: 90 tablet, Rfl: 3    dulaglutide (Trulicity) 0.75 mg/0.5 mL pen injector, INJECT 0.75 MG UNDER THE SKIN 1 (ONE) TIME PER WEEK., Disp: 4 each, Rfl: 0      Allergies as of 12/16/2024 - Reviewed 12/16/2024   Allergen Reaction Noted    Bee venom protein (honey bee) Anaphylaxis 07/02/2012    Latex Anaphylaxis and Rash 07/02/2012    Adhesive tape-silicones Other 09/08/2022    Lidocaine Nausea/vomiting 01/10/2024    Nickel Swelling 04/04/2023    Penicillins Hives, Rash, Unknown, and Nausea/vomiting 07/02/2012    Prednisone Unknown 11/19/2015    Procaine Unknown and Nausea/vomiting 01/10/2024    Red dye Rash 08/14/2013         Review of Systems   Cardiology: Lightheadedness-denies.  Chest pain-denies.  Leg edema-denies.  Palpitations-denies.  Respiratory: Cough-denies. Shortness of breath-denies.  Wheezing-denies.  Gastroenterology: Constipation-denies.  Diarrhea-denies.  Heartburn-denies.  Endocrinology: Cold intolerance-denies.  Heat intolerance-denies.  Sweats-denies.  Neurology: Headache-denies.  " Tremor-denies.  Neuropathy in extremities-denies.  Psychology: Low energy-denies.  Irritability-denies.  Sleep disturbances-denies.      /78   Ht 1.676 m (5' 6\")   Wt 127 kg (279 lb 9.6 oz)   LMP 07/19/2024 (Exact Date)   BMI 45.13 kg/m²       Labs:  Lab Results   Component Value Date    WBC 7.4 10/01/2024    NRBC 0.0 10/01/2024    RBC 5.13 10/01/2024    HGB 12.8 10/01/2024    HCT 40.3 10/01/2024     10/01/2024     Lab Results   Component Value Date    CALCIUM 9.7 10/01/2024    AST 55 (H) 10/01/2024    ALKPHOS 89 10/01/2024    BILITOT 0.4 10/01/2024    PROT 7.5 10/01/2024    ALBUMIN 4.1 10/01/2024    GLOB 3.6 06/08/2023    AGR 1.1 (L) 06/08/2023     10/01/2024    K 4.0 10/01/2024     10/01/2024    CO2 23 10/01/2024    ANIONGAP 12 10/01/2024    BUN 8 10/01/2024    CREATININE 0.71 10/01/2024    UREACREAUR 11.4 06/08/2023    GLUCOSE 120 (H) 10/01/2024    ALT 60 (H) 10/01/2024    EGFR >90 10/01/2024     Lab Results   Component Value Date    CHOL 197 06/08/2023    TRIG 94 06/08/2023    HDL 53 06/08/2023    LDLCALC 125 06/08/2023     No results found for: \"MICROALBCREA\"  Lab Results   Component Value Date    TSH 2.78 10/01/2024     Lab Results   Component Value Date    BEFSTNYT66 1,668 (H) 02/18/2019     Lab Results   Component Value Date    HGBA1C 6.9 (A) 12/16/2024         Physical Exam   General Appearance: pleasant, cooperative, no acute distress  HEENT: no chemosis, no proptosis, no lid lag, no lid retraction  Neck: no lymphadenopathy, no thyromegaly, no dominant thyroid nodules  Heart: no murmurs, regular rate and rhythm, S1 and S2  Lungs: no wheezes, no rhonci, no rales  Extremities: no lower extremity swelling      Assessment/Plan   Dm2  -reviewed glycemic log/labs/notes  -A1c ordered and reviewed    -reviewed glycemic targets/carb allowances  -work on carb allowances/physical activity  -test every day  -new lab orders entered    -continue trulicity 0.75mg for another month, if not " feeling any better can look into sglt2-I, dpp4-I, low dose tzd (as issues with metformin in the past)    2. Bp at target, takes clonidine for anxiety    3.) Lipids  -reviewed latest labs  -repeat prior to next visit, low threshold for statin for primary prevention    Follow Up:  abhijeet Ernst 3 months    -labs/tests/notes reviewed  -reviewed and counseled patient on medication monitoring and side effects

## 2024-12-16 ENCOUNTER — APPOINTMENT (OUTPATIENT)
Dept: ENDOCRINOLOGY | Facility: CLINIC | Age: 39
End: 2024-12-16
Payer: COMMERCIAL

## 2024-12-16 ENCOUNTER — PHARMACY VISIT (OUTPATIENT)
Dept: PHARMACY | Facility: CLINIC | Age: 39
End: 2024-12-16
Payer: MEDICAID

## 2024-12-16 VITALS
SYSTOLIC BLOOD PRESSURE: 112 MMHG | BODY MASS INDEX: 44.93 KG/M2 | HEIGHT: 66 IN | WEIGHT: 279.6 LBS | DIASTOLIC BLOOD PRESSURE: 78 MMHG

## 2024-12-16 DIAGNOSIS — R73.03 PREDIABETES: ICD-10-CM

## 2024-12-16 DIAGNOSIS — E11.65 TYPE II OR UNSPECIFIED TYPE DIABETES MELLITUS WITH RENAL MANIFESTATIONS, UNCONTROLLED(250.42) (MULTI): ICD-10-CM

## 2024-12-16 DIAGNOSIS — E11.29 TYPE II OR UNSPECIFIED TYPE DIABETES MELLITUS WITH RENAL MANIFESTATIONS, UNCONTROLLED(250.42) (MULTI): ICD-10-CM

## 2024-12-16 DIAGNOSIS — R53.83 FATIGUE, UNSPECIFIED TYPE: ICD-10-CM

## 2024-12-16 DIAGNOSIS — E11.65 TYPE II OR UNSPECIFIED TYPE DIABETES MELLITUS WITH RENAL MANIFESTATIONS, UNCONTROLLED(250.42) (MULTI): Primary | ICD-10-CM

## 2024-12-16 DIAGNOSIS — R73.9 HYPERGLYCEMIA: ICD-10-CM

## 2024-12-16 DIAGNOSIS — E11.29 TYPE II OR UNSPECIFIED TYPE DIABETES MELLITUS WITH RENAL MANIFESTATIONS, UNCONTROLLED(250.42) (MULTI): Primary | ICD-10-CM

## 2024-12-16 LAB — POC HEMOGLOBIN A1C: 6.9 % (ref 4.2–6.5)

## 2024-12-16 PROCEDURE — 3008F BODY MASS INDEX DOCD: CPT | Performed by: INTERNAL MEDICINE

## 2024-12-16 PROCEDURE — 83036 HEMOGLOBIN GLYCOSYLATED A1C: CPT | Performed by: INTERNAL MEDICINE

## 2024-12-16 PROCEDURE — RXMED WILLOW AMBULATORY MEDICATION CHARGE

## 2024-12-16 PROCEDURE — 1036F TOBACCO NON-USER: CPT | Performed by: INTERNAL MEDICINE

## 2024-12-16 PROCEDURE — 99204 OFFICE O/P NEW MOD 45 MIN: CPT | Performed by: INTERNAL MEDICINE

## 2024-12-16 RX ORDER — LANCETS 33 GAUGE
EACH MISCELLANEOUS
Qty: 300 EACH | Refills: 1 | Status: SHIPPED | OUTPATIENT
Start: 2024-12-16

## 2024-12-16 RX ORDER — DEXTROSE 4 G
TABLET,CHEWABLE ORAL
Qty: 1 EACH | Refills: 0 | Status: SHIPPED | OUTPATIENT
Start: 2024-12-16

## 2024-12-16 RX ORDER — DEXTROSE 4 G
TABLET,CHEWABLE ORAL
Qty: 1 EACH | Refills: 0 | Status: SHIPPED | OUTPATIENT
Start: 2024-12-16 | End: 2024-12-16 | Stop reason: SDUPTHER

## 2024-12-16 RX ORDER — VALACYCLOVIR HYDROCHLORIDE 1 G/1
1000 TABLET, FILM COATED ORAL DAILY
COMMUNITY
Start: 2024-03-22

## 2024-12-16 ASSESSMENT — ENCOUNTER SYMPTOMS
LOSS OF SENSATION IN FEET: 0
DEPRESSION: 0
OCCASIONAL FEELINGS OF UNSTEADINESS: 0

## 2024-12-16 ASSESSMENT — PAIN SCALES - GENERAL: PAINLEVEL_OUTOF10: 0-NO PAIN

## 2024-12-16 ASSESSMENT — PATIENT HEALTH QUESTIONNAIRE - PHQ9
1. LITTLE INTEREST OR PLEASURE IN DOING THINGS: NOT AT ALL
SUM OF ALL RESPONSES TO PHQ9 QUESTIONS 1 & 2: 0
2. FEELING DOWN, DEPRESSED OR HOPELESS: NOT AT ALL

## 2024-12-16 NOTE — TELEPHONE ENCOUNTER
Meaghan Gutierrez   1985   [id number]   [phone]       Called and spoke to patient in regard to medical supply Blood Glucose Meter needing PA.     We are sending your prescription to [facility] for benefits investigation and affordability support.      If you have any questions or would like to check the status of your prescription(s),  please contact the pharmacy directly at [phone].

## 2024-12-18 ENCOUNTER — CLINICAL SUPPORT (OUTPATIENT)
Dept: SLEEP MEDICINE | Facility: HOSPITAL | Age: 39
End: 2024-12-18
Payer: COMMERCIAL

## 2024-12-18 VITALS
HEART RATE: 76 BPM | WEIGHT: 280.43 LBS | HEIGHT: 66 IN | BODY MASS INDEX: 45.07 KG/M2 | OXYGEN SATURATION: 94 % | RESPIRATION RATE: 76 BRPM | SYSTOLIC BLOOD PRESSURE: 127 MMHG | DIASTOLIC BLOOD PRESSURE: 93 MMHG

## 2024-12-18 DIAGNOSIS — G47.33 OSA (OBSTRUCTIVE SLEEP APNEA): ICD-10-CM

## 2024-12-18 ASSESSMENT — SLEEP AND FATIGUE QUESTIONNAIRES
HOW LIKELY ARE YOU TO NOD OFF OR FALL ASLEEP WHILE SITTING QUIETLY AFTER LUNCH WITHOUT ALCOHOL: HIGH CHANCE OF DOZING
HOW LIKELY ARE YOU TO NOD OFF OR FALL ASLEEP WHILE SITTING AND READING: HIGH CHANCE OF DOZING
SITING INACTIVE IN A PUBLIC PLACE LIKE A CLASS ROOM OR A MOVIE THEATER: SLIGHT CHANCE OF DOZING
ESS-CHAD TOTAL SCORE: 14
HOW LIKELY ARE YOU TO NOD OFF OR FALL ASLEEP WHILE SITTING AND TALKING TO SOMEONE: WOULD NEVER DOZE
HOW LIKELY ARE YOU TO NOD OFF OR FALL ASLEEP IN A CAR, WHILE STOPPED FOR A FEW MINUTES IN TRAFFIC: WOULD NEVER DOZE
HOW LIKELY ARE YOU TO NOD OFF OR FALL ASLEEP WHILE LYING DOWN TO REST IN THE AFTERNOON WHEN CIRCUMSTANCES PERMIT: MODERATE CHANCE OF DOZING
HOW LIKELY ARE YOU TO NOD OFF OR FALL ASLEEP WHEN YOU ARE A PASSENGER IN A CAR FOR AN HOUR WITHOUT A BREAK: MODERATE CHANCE OF DOZING
HOW LIKELY ARE YOU TO NOD OFF OR FALL ASLEEP WHILE WATCHING TV: HIGH CHANCE OF DOZING

## 2024-12-19 NOTE — PROGRESS NOTES
[facility]UST TECH NOTE:     Patient: Meaghan Gutierrez   MRN//AGE: [id number]  1985  39 y.o.   Technologist: JENNIFER Lombardi   Room: 1   Service Date: 2024        Sleep Testing Location: [facility]: 14    TECHNOLOGIST SLEEP STUDY PROCEDURE NOTE:   This sleep study is being conducted according to the policies and procedures outlined by the AAS accreditation standards.  The sleep study procedure and processes involved during this appointment was explained to the patient/patient’s family, questions were answered. The patient/family verbalized understanding.      The patient is a 39 y.o. year old female scheduled for a Diagnostic PSG Split night . She arrived for her appointment.      The study that was ultimately completed was a Diagnostic PSG Split night .    The full study was completed.  Patient questionnaires completed?: yes     Consents signed? yes    Initial Fall Risk Screening:     Meaghan has fallen in the last 6 months. Meaghan does not have a fear of falling. She does not need assistance with sitting, standing, or walking. She does not need assistance walking in her home. She does not need assistance in an unfamiliar setting. The patient is not using an assistive device.     Brief Study observations: Patient was referred for a Split study. Patient reached sleep onset quickly. Loud snoring was observed. Patient did not meet the criteria for a Split study. AHI below 15. Patient was seen in left, right, and supine position.      After the procedure, the patient/family was informed to ensure followup with ordering clinician for testing results.      Technologist: JENNIFER Lombardi

## 2025-01-30 ENCOUNTER — APPOINTMENT (OUTPATIENT)
Dept: SLEEP MEDICINE | Facility: CLINIC | Age: 40
End: 2025-01-30
Payer: COMMERCIAL

## 2025-02-13 ENCOUNTER — TELEPHONE (OUTPATIENT)
Dept: ENDOCRINOLOGY | Facility: CLINIC | Age: 40
End: 2025-02-13
Payer: COMMERCIAL

## 2025-02-13 DIAGNOSIS — E11.65 TYPE II OR UNSPECIFIED TYPE DIABETES MELLITUS WITH RENAL MANIFESTATIONS, UNCONTROLLED(250.42) (MULTI): Primary | ICD-10-CM

## 2025-02-13 DIAGNOSIS — E11.29 TYPE II OR UNSPECIFIED TYPE DIABETES MELLITUS WITH RENAL MANIFESTATIONS, UNCONTROLLED(250.42) (MULTI): Primary | ICD-10-CM

## 2025-02-13 NOTE — TELEPHONE ENCOUNTER
Pt states that she is tolerating her current dose of Trulicity, she is out of Rx. She would like to know if she can get a higher dose of Rx now?

## 2025-03-05 ENCOUNTER — TELEPHONE (OUTPATIENT)
Dept: ENDOCRINOLOGY | Facility: CLINIC | Age: 40
End: 2025-03-05
Payer: COMMERCIAL

## 2025-03-05 NOTE — TELEPHONE ENCOUNTER
Patient in class and states her eyes are cold, dry but can see ok -she feels dizzy --she ate sausage and toast today and takes trulicity 1.5mg on Thursdays-she doesn't have a meter to check her blood glucose but will reach out to nurse teaching class to see if they can assist her with checking glucose and she will call back with result --she is drinking water and will maybe get some juice

## 2025-03-05 NOTE — TELEPHONE ENCOUNTER
PATIENT CALLED BACK @ 2:15PM STATING THAT SHE DRANK MORE WATER AND JUICE AND SHE WASN'T ABLE TO CHECK HER GLUCOSE BUT SHE IS FEELING BETTER

## 2025-03-14 NOTE — PROGRESS NOTES
HPI   38 yo presented as new pt for metabolic management  last visit, here for follow up today.  Pt with dm2 (dx dec 2024), mira (getting evaluated for cpap), depression/anxiety, obesity (280 lbs highest wt).  A1c-6.5% today, was 6.9% lat visit.    Pt is testing sugars <1/day, when testing 80-90's before meals.  Pt is following carb controlled diet.  Not as active since knee surgery.    Taking trulicity 1.5mg weekly, tolerating    -had tried metformin in the past-question of diarrhea with it    -has struggled with lifestyle to lose wt, had been in 230 lb range since knee surgery and less active job        Current Outpatient Medications:     amphetamine-dextroamphetamine (Adderall) 30 mg tablet, Take 1 tablet (30 mg) by mouth once daily., Disp: , Rfl:     amphetamine-dextroamphetamine (Adderall) 5 mg tablet, Take 1 tablet (5 mg) by mouth 3 (three) times a week. Friday, Saturday and Sunday, Disp: , Rfl:     blood sugar diagnostic strip, Use qd, Disp: 100 strip, Rfl: 3    blood-glucose meter misc, use as directed, Disp: 1 each, Rfl: 0    cetirizine (ZyrTEC) 10 mg tablet, Take 1 tablet (10 mg) by mouth once daily., Disp: 90 tablet, Rfl: 3    cholecalciferol (Vitamin D-3) 50 MCG (2000 UT) tablet, Take 1 tablet (2,000 Units) by mouth once daily., Disp: 90 tablet, Rfl: 3    cloNIDine (Catapres) 0.2 mg tablet, Take 1 tablet (0.2 mg) by mouth 3 times a day., Disp: , Rfl:     dulaglutide (Trulicity) 1.5 mg/0.5 mL pen injector injection, Inject 1.5 mg under the skin 1 (one) time per week., Disp: 2 mL, Rfl: 11    estradiol (Estrace) 0.5 mg tablet, Take 1 tablet (0.5 mg) by mouth once daily., Disp: , Rfl:     gabapentin (Neurontin) 400 mg capsule, Take 1 capsule (400 mg) by mouth 2 times a day., Disp: , Rfl:     ibuprofen 600 mg tablet, Take 1 tablet (600 mg) by mouth every 8 hours if needed for mild pain (1 - 3) or fever (temp greater than 38.0 C)., Disp: 30 tablet, Rfl: 0    lancets (OneTouch Delica Plus Lancet) 33 gauge misc,  "Test tid, Disp: 300 each, Rfl: 1    valACYclovir (Valtrex) 1 gram tablet, Take 1 tablet (1,000 mg) by mouth once daily., Disp: , Rfl:     Vraylar 6 mg capsule, Take 1 capsule (6 mg) by mouth once daily., Disp: , Rfl:     Allergies as of 03/17/2025 - Reviewed 03/17/2025   Allergen Reaction Noted    Bee venom protein (honey bee) Anaphylaxis 07/02/2012    Latex Anaphylaxis and Rash 07/02/2012    Adhesive tape-silicones Other 09/08/2022    Lidocaine Nausea/vomiting 01/10/2024    Nickel Swelling 04/04/2023    Penicillins Hives, Rash, Unknown, and Nausea/vomiting 07/02/2012    Prednisone Unknown 11/19/2015    Procaine Unknown and Nausea/vomiting 01/10/2024    Red dye Rash 08/14/2013       /89 (BP Location: Right arm, Patient Position: Sitting)   Pulse 73   Wt 123 kg (271 lb 9.6 oz)   LMP 07/19/2024 (Exact Date)   BMI 43.84 kg/m²     Labs:   Lab Results   Component Value Date    WBC 7.4 10/01/2024    NRBC 0.0 10/01/2024    RBC 5.13 10/01/2024    HGB 12.8 10/01/2024    HCT 40.3 10/01/2024     10/01/2024     Lab Results   Component Value Date    CALCIUM 9.7 10/01/2024    AST 55 (H) 10/01/2024    ALKPHOS 89 10/01/2024    BILITOT 0.4 10/01/2024    PROT 7.5 10/01/2024    ALBUMIN 4.1 10/01/2024    GLOB 3.6 06/08/2023    AGR 1.1 (L) 06/08/2023     10/01/2024    K 4.0 10/01/2024     10/01/2024    CO2 23 10/01/2024    ANIONGAP 12 10/01/2024    BUN 8 10/01/2024    CREATININE 0.71 10/01/2024    UREACREAUR 11.4 06/08/2023    GLUCOSE 120 (H) 10/01/2024    ALT 60 (H) 10/01/2024    EGFR >90 10/01/2024     Lab Results   Component Value Date    CHOL 197 06/08/2023    TRIG 94 06/08/2023    HDL 53 06/08/2023    LDLCALC 125 06/08/2023     No results found for: \"MICROALBCREA\"  Lab Results   Component Value Date    TSH 2.78 10/01/2024     Lab Results   Component Value Date    HODRWSDR12 1,668 (H) 02/18/2019     Lab Results   Component Value Date    HGBA1C 6.5 03/17/2025       Assessment/Plan   1. Type II or " unspecified type diabetes mellitus with renal manifestations, uncontrolled(250.42) (Multi)  -A1c ordered and reviewed  -labs reviewed, update before next visit  -glycemic values reviewed, targets for testing reviewed, check bs 3 times weekly at different times    -overall doing well  -weight down 8 pounds, increase trulicity to 3.0 mg weekly  -reviewed dm pathophysiology, CHO foods, meal planning, reinforced carb limits and protein snacks  -benefits of increased activity reviewed, advised increasing daily activity, working up to 30 minutes most days         Follow up: Dr. Segovia 6 months    -labs/tests/notes reviewed  -reviewed and counseled patient on medication monitoring and side effects    Treatment and plan discussed with Dr. Segovia. Mart RN Certified [employer] [facility] and

## 2025-03-17 ENCOUNTER — APPOINTMENT (OUTPATIENT)
Dept: ENDOCRINOLOGY | Facility: CLINIC | Age: 40
End: 2025-03-17
Payer: COMMERCIAL

## 2025-03-17 VITALS
DIASTOLIC BLOOD PRESSURE: 89 MMHG | HEART RATE: 73 BPM | WEIGHT: 271.6 LBS | BODY MASS INDEX: 43.84 KG/M2 | SYSTOLIC BLOOD PRESSURE: 117 MMHG

## 2025-03-17 DIAGNOSIS — E11.65 TYPE II OR UNSPECIFIED TYPE DIABETES MELLITUS WITH RENAL MANIFESTATIONS, UNCONTROLLED(250.42) (MULTI): ICD-10-CM

## 2025-03-17 DIAGNOSIS — E11.65 TYPE 2 DIABETES MELLITUS WITH HYPERGLYCEMIA, WITHOUT LONG-TERM CURRENT USE OF INSULIN: Primary | ICD-10-CM

## 2025-03-17 DIAGNOSIS — E11.29 TYPE II OR UNSPECIFIED TYPE DIABETES MELLITUS WITH RENAL MANIFESTATIONS, UNCONTROLLED(250.42) (MULTI): ICD-10-CM

## 2025-03-17 LAB — POC HEMOGLOBIN A1C: 6.5 % (ref 4.2–6.5)

## 2025-03-17 PROCEDURE — 83036 HEMOGLOBIN GLYCOSYLATED A1C: CPT | Performed by: INTERNAL MEDICINE

## 2025-03-17 PROCEDURE — 99213 OFFICE O/P EST LOW 20 MIN: CPT | Performed by: INTERNAL MEDICINE

## 2025-03-17 RX ORDER — DULAGLUTIDE 3 MG/.5ML
3 INJECTION, SOLUTION SUBCUTANEOUS
Qty: 2 ML | Refills: 5 | Status: SHIPPED | OUTPATIENT
Start: 2025-03-17

## 2025-03-17 ASSESSMENT — PAIN SCALES - GENERAL: PAINLEVEL_OUTOF10: 0-NO PAIN

## 2025-03-17 NOTE — PROGRESS NOTES
Attestation signed by Evelio Segovia MD on 3/17/25 at 3:15 PM.    I, Dr Evelio Segovia, have reviewed this progress note, medication list, vital signs, any pertinent lab values, and any CGM data if present with the Cert[facility] Diabetes [facility] and  face to face during this visit today. This note reflects the treatment plan that was made under my direction after reviewing the above mentioned elements while face to face with the patient and CDE.  I personally answered and addressed any questions and concerns the patient had during the visit today.  The CDE entered the data in this note under my direction and I personally reviewed it, signed any lab or medication orders that I instructed to be completed. I am the billing provider for this visit and the level of service was determined by my involvement in the Medical Decision Making Component of this visit while face to face with the patient.

## 2025-03-17 NOTE — PATIENT INSTRUCTIONS
Go for fasting labwork     Check blood sugar 2-3 times per week     Increase daily activity, working up to 30 minutes most days      Diabetes Resources:   American Diabetes Association   www.diabetes.org   [employer]  www.diabeteseducation.novocare.com  Taking Care of Your Diabetes  www.[employer]   Meal Planning   www.diabetesfoodhub.org   www.[employer]   Coping with Diabetes   www.behavioraldiabetes.org   www.beyondtype2.org - support group, information and resources   Books:   Reese tapia to look up carbs   Bright Spots and Landmines by Mark Campbell (author has type 1 diabetes)   www.diatribe.com -general information; can download Bright Spots and Landmines book for FREE www.beyondtype1.org - support group, information and resources   Apps:   Reese tapia   My fitness pal   Beyond Type 1 - support group dean   Beyond Type 2 - support group dean

## 2025-07-09 ENCOUNTER — APPOINTMENT (OUTPATIENT)
Dept: CARDIOLOGY | Facility: HOSPITAL | Age: 40
End: 2025-07-09
Payer: COMMERCIAL

## 2025-07-09 ENCOUNTER — HOSPITAL ENCOUNTER (EMERGENCY)
Facility: HOSPITAL | Age: 40
Discharge: HOME | End: 2025-07-09
Payer: COMMERCIAL

## 2025-07-09 ENCOUNTER — APPOINTMENT (OUTPATIENT)
Dept: RADIOLOGY | Facility: HOSPITAL | Age: 40
End: 2025-07-09
Payer: COMMERCIAL

## 2025-07-09 VITALS
HEIGHT: 66 IN | BODY MASS INDEX: 41.78 KG/M2 | TEMPERATURE: 97.4 F | DIASTOLIC BLOOD PRESSURE: 94 MMHG | RESPIRATION RATE: 20 BRPM | SYSTOLIC BLOOD PRESSURE: 151 MMHG | OXYGEN SATURATION: 98 % | WEIGHT: 260 LBS | HEART RATE: 100 BPM

## 2025-07-09 DIAGNOSIS — J18.9 PNEUMONIA OF LEFT LOWER LOBE DUE TO INFECTIOUS ORGANISM: Primary | ICD-10-CM

## 2025-07-09 LAB
ALBUMIN SERPL BCP-MCNC: 4 G/DL (ref 3.4–5)
ALP SERPL-CCNC: 74 U/L (ref 33–110)
ALT SERPL W P-5'-P-CCNC: 43 U/L (ref 7–45)
ANION GAP SERPL CALCULATED.3IONS-SCNC: 12 MMOL/L (ref 10–20)
AST SERPL W P-5'-P-CCNC: 33 U/L (ref 9–39)
ATRIAL RATE: 87 BPM
BASOPHILS # BLD AUTO: 0.09 X10*3/UL (ref 0–0.1)
BASOPHILS NFR BLD AUTO: 1.2 %
BILIRUB SERPL-MCNC: 0.3 MG/DL (ref 0–1.2)
BUN SERPL-MCNC: 8 MG/DL (ref 6–23)
CALCIUM SERPL-MCNC: 9.1 MG/DL (ref 8.6–10.3)
CARDIAC TROPONIN I PNL SERPL HS: 3 NG/L (ref 0–13)
CHLORIDE SERPL-SCNC: 103 MMOL/L (ref 98–107)
CO2 SERPL-SCNC: 26 MMOL/L (ref 21–32)
CREAT SERPL-MCNC: 0.75 MG/DL (ref 0.5–1.05)
D DIMER PPP FEU-MCNC: 0.43 MG/L FEU (ref 0.19–0.5)
EGFRCR SERPLBLD CKD-EPI 2021: >90 ML/MIN/1.73M*2
EOSINOPHIL # BLD AUTO: 0.13 X10*3/UL (ref 0–0.7)
EOSINOPHIL NFR BLD AUTO: 1.7 %
ERYTHROCYTE [DISTWIDTH] IN BLOOD BY AUTOMATED COUNT: 13.3 % (ref 11.5–14.5)
GLUCOSE SERPL-MCNC: 131 MG/DL (ref 74–99)
HCT VFR BLD AUTO: 39.5 % (ref 36–46)
HGB BLD-MCNC: 12.8 G/DL (ref 12–16)
IMM GRANULOCYTES # BLD AUTO: 0.02 X10*3/UL (ref 0–0.7)
IMM GRANULOCYTES NFR BLD AUTO: 0.3 % (ref 0–0.9)
LYMPHOCYTES # BLD AUTO: 2.92 X10*3/UL (ref 1.2–4.8)
LYMPHOCYTES NFR BLD AUTO: 37.7 %
MAGNESIUM SERPL-MCNC: 1.77 MG/DL (ref 1.6–2.4)
MCH RBC QN AUTO: 26.4 PG (ref 26–34)
MCHC RBC AUTO-ENTMCNC: 32.4 G/DL (ref 32–36)
MCV RBC AUTO: 82 FL (ref 80–100)
MONOCYTES # BLD AUTO: 0.47 X10*3/UL (ref 0.1–1)
MONOCYTES NFR BLD AUTO: 6.1 %
NEUTROPHILS # BLD AUTO: 4.11 X10*3/UL (ref 1.2–7.7)
NEUTROPHILS NFR BLD AUTO: 53 %
NRBC BLD-RTO: 0 /100 WBCS (ref 0–0)
P AXIS: 45 DEGREES
P OFFSET: 195 MS
P ONSET: 148 MS
PLATELET # BLD AUTO: 329 X10*3/UL (ref 150–450)
POTASSIUM SERPL-SCNC: 3.6 MMOL/L (ref 3.5–5.3)
PR INTERVAL: 150 MS
PROT SERPL-MCNC: 7.1 G/DL (ref 6.4–8.2)
Q ONSET: 223 MS
QRS COUNT: 15 BEATS
QRS DURATION: 110 MS
QT INTERVAL: 368 MS
QTC CALCULATION(BAZETT): 442 MS
QTC FREDERICIA: 416 MS
R AXIS: 3 DEGREES
RBC # BLD AUTO: 4.84 X10*6/UL (ref 4–5.2)
SODIUM SERPL-SCNC: 137 MMOL/L (ref 136–145)
T AXIS: 40 DEGREES
T OFFSET: 407 MS
VENTRICULAR RATE: 87 BPM
WBC # BLD AUTO: 7.7 X10*3/UL (ref 4.4–11.3)

## 2025-07-09 PROCEDURE — 2500000004 HC RX 250 GENERAL PHARMACY W/ HCPCS (ALT 636 FOR OP/ED): Mod: JZ

## 2025-07-09 PROCEDURE — 85025 COMPLETE CBC W/AUTO DIFF WBC: CPT

## 2025-07-09 PROCEDURE — 84484 ASSAY OF TROPONIN QUANT: CPT

## 2025-07-09 PROCEDURE — 84075 ASSAY ALKALINE PHOSPHATASE: CPT

## 2025-07-09 PROCEDURE — 93005 ELECTROCARDIOGRAM TRACING: CPT

## 2025-07-09 PROCEDURE — 2500000001 HC RX 250 WO HCPCS SELF ADMINISTERED DRUGS (ALT 637 FOR MEDICARE OP)

## 2025-07-09 PROCEDURE — 83735 ASSAY OF MAGNESIUM: CPT

## 2025-07-09 PROCEDURE — 71045 X-RAY EXAM CHEST 1 VIEW: CPT

## 2025-07-09 PROCEDURE — 36415 COLL VENOUS BLD VENIPUNCTURE: CPT

## 2025-07-09 PROCEDURE — 71045 X-RAY EXAM CHEST 1 VIEW: CPT | Performed by: RADIOLOGY

## 2025-07-09 PROCEDURE — 99285 EMERGENCY DEPT VISIT HI MDM: CPT | Mod: 25

## 2025-07-09 PROCEDURE — 96372 THER/PROPH/DIAG INJ SC/IM: CPT

## 2025-07-09 PROCEDURE — 85300 ANTITHROMBIN III ACTIVITY: CPT

## 2025-07-09 RX ORDER — DOXYCYCLINE 100 MG/1
100 CAPSULE ORAL ONCE
Status: COMPLETED | OUTPATIENT
Start: 2025-07-09 | End: 2025-07-09

## 2025-07-09 RX ORDER — KETOROLAC TROMETHAMINE 15 MG/ML
15 INJECTION, SOLUTION INTRAMUSCULAR; INTRAVENOUS ONCE
Status: COMPLETED | OUTPATIENT
Start: 2025-07-09 | End: 2025-07-09

## 2025-07-09 RX ORDER — METOPROLOL TARTRATE 1 MG/ML
5 INJECTION, SOLUTION INTRAVENOUS ONCE
Status: DISCONTINUED | OUTPATIENT
Start: 2025-07-09 | End: 2025-07-09

## 2025-07-09 RX ORDER — DOXYCYCLINE 100 MG/1
100 CAPSULE ORAL 2 TIMES DAILY
Qty: 10 CAPSULE | Refills: 0 | Status: SHIPPED | OUTPATIENT
Start: 2025-07-09 | End: 2025-07-14

## 2025-07-09 RX ORDER — ACETAMINOPHEN 325 MG/1
975 TABLET ORAL ONCE
Status: COMPLETED | OUTPATIENT
Start: 2025-07-09 | End: 2025-07-09

## 2025-07-09 RX ADMIN — ACETAMINOPHEN 975 MG: 325 TABLET ORAL at 05:03

## 2025-07-09 RX ADMIN — KETOROLAC TROMETHAMINE 15 MG: 15 INJECTION, SOLUTION INTRAMUSCULAR; INTRAVENOUS at 05:02

## 2025-07-09 RX ADMIN — DOXYCYCLINE HYCLATE 100 MG: 100 CAPSULE ORAL at 05:54

## 2025-07-09 ASSESSMENT — PAIN SCALES - GENERAL: PAINLEVEL_OUTOF10: 6

## 2025-07-09 ASSESSMENT — PAIN DESCRIPTION - FREQUENCY: FREQUENCY: CONSTANT/CONTINUOUS

## 2025-07-09 ASSESSMENT — PAIN DESCRIPTION - ORIENTATION: ORIENTATION: UPPER

## 2025-07-09 ASSESSMENT — PAIN DESCRIPTION - LOCATION: LOCATION: BACK

## 2025-07-09 ASSESSMENT — PAIN - FUNCTIONAL ASSESSMENT: PAIN_FUNCTIONAL_ASSESSMENT: 0-10

## 2025-07-09 ASSESSMENT — PAIN DESCRIPTION - PAIN TYPE: TYPE: ACUTE PAIN

## 2025-07-09 ASSESSMENT — PAIN DESCRIPTION - DESCRIPTORS: DESCRIPTORS: DULL

## 2025-07-09 NOTE — DISCHARGE INSTRUCTIONS
You have a small pneumonia on your x-ray, will discharge with doxycycline.  You need to follow-up with your primary care doctor this week for repeat evaluation

## 2025-07-09 NOTE — Clinical Note
Meaghan Gutierrez was seen and treated in our emergency department on 7/9/2025.  She may return to work on 07/10/2025.       If you have any questions or concerns, please don't hesitate to call.      Joce Mccord MD

## 2025-07-09 NOTE — ED TRIAGE NOTES
To ED from home, pt c/o upper back pain between her shoulder blades. Started 2 days ago. Denies injury. States pain increases with coughing and movement. Denies f/c's. States she does vape. Denies SOB. States no chest pain at rest and/or deep breathing, but some pain when pushing down on chest.

## 2025-07-11 NOTE — ED PROVIDER NOTES
Emergency Department Provider Note       History of Present Illness     History provided by: Patient  Limitations to History: None  External Records Reviewed with Brief Summary: None    HPI:  Meaghan Gutierrez is a 40 y.o. female presents for back pain.  She works night shift.  She tells me that over the past day due to having shoulder pain that worsens with deep pronation.  She otherwise denies any left-sided chest pain, no radiation, she also has a cough as well.  She denies any fevers, not overly short of breath.  Denies any calf swelling    Physical Exam   Triage vitals:  T 36.3 °C (97.4 °F)  HR (!) 105  BP (!) 154/106  RR 20  O2 98 % None (Room air)    Physical Exam  Vitals and nursing note reviewed.   Constitutional:       Appearance: Normal appearance.   HENT:      Head: Normocephalic and atraumatic.      Nose: Nose normal.      Mouth/Throat:      Mouth: Mucous membranes are moist.   Eyes:      Extraocular Movements: Extraocular movements intact.      Conjunctiva/sclera: Conjunctivae normal.   Cardiovascular:      Rate and Rhythm: Normal rate and regular rhythm.      Pulses: Normal pulses.      Heart sounds: Normal heart sounds.   Pulmonary:      Effort: Pulmonary effort is normal.      Breath sounds: Normal breath sounds.   Musculoskeletal:         General: Normal range of motion.      Cervical back: Normal range of motion and neck supple.   Skin:     General: Skin is warm and dry.      Capillary Refill: Capillary refill takes less than 2 seconds.   Neurological:      General: No focal deficit present.      Mental Status: She is alert and oriented to person, place, and time. Mental status is at baseline.           Medical Decision Making & ED Course   Medical Decision Makin y.o. female presents for shoulder pain.  Differential includes pneumonia versus ACS versus muscle skeletal pain.  She otherwise has an unremarkable exam and I cannot reproduce her pain.  Having a cough.  Otherwise has low  risk factors for cardiac.  I cannot PERC her out however, we will proceed with D-dimer    Labs evaluated, CMP unremarkable, CBC unremarkable with no leukocytosis, D-dimer negative.  Troponin negative as well.  On chest x-ray there is some concern for possible infiltrate on the left side versus atelectasis.  Given her cough, I will empirically treat her as a community-acquired pneumonia with doxycycline.  She was given a dose here.  She otherwise saturating well, she does not appear in distress or septic.  Given her labs very unlikely to be acute sepsis.  Will discharge her with primary care follow-up as well as p.o. doxycycline for a week.  Recommended return to the ED with any dyspnea or uncontrolled fevers and chills.  She verbalized understanding and was discharged  ----  Perc positive    Differential diagnoses considered include but are not limited to: Pneumonia versus ACS versus musculoskeletal pain versus PE    Social Determinants of Health which Significantly Impact Care: Social Determinants of Health which Significantly Impact Care: None identified     EKG Independent Interpretation: EKG interpreted by myself. Please see ED Course for full interpretation.    Independent Result Review and Interpretation: Relevant laboratory and radiographic results were reviewed and independently interpreted by myself.  As necessary, they are commented on in the ED Course.    Chronic conditions affecting the patient's care: As documented above in [facility]    The patient was discussed with the following consultants/services: None    Care Considerations: As documented above in [facility]    ED Course:  ED Course as of 07/11/25 1211   Wed Jul 09, 2025   0413 EKG interpreted by myself at 0413  Sinus rhythm with a rate of 87  No MS, QRS or QT prolongation.   No ST elevations or depressions concerning for STEMI.      [SY]      ED Course User Index  [SY] Joce Mccord MD         Diagnoses as of 07/11/25 1211   Pneumonia of left lower lobe due to  infectious organism       Disposition   As a result of the work-up, the patient was discharged home.  she was informed of her diagnosis and instructed to come back with any concerns or worsening of condition.  she and was agreeable to the plan as discussed above.  she was given the opportunity to ask questions.  All of the patient's questions were answered.    Procedures   Procedures        Joce Mccord MD  Emergency Medicine                                                       Joce Mccord MD  07/11/25 [id number]

## 2025-09-17 ENCOUNTER — APPOINTMENT (OUTPATIENT)
Dept: ENDOCRINOLOGY | Facility: CLINIC | Age: 40
End: 2025-09-17
Payer: COMMERCIAL

## (undated) DEVICE — DRAPE, TIBURON W/ADHESIVE, 19 X 30

## (undated) DEVICE — TUBING SET, TRI-LUMEN, FILTERED, F/AIRSEAL

## (undated) DEVICE — Device

## (undated) DEVICE — SUTURE, MONOCRYL, 4-0, 27 IN, PS-2, UNDYED

## (undated) DEVICE — IRRIGATOR, WOUND, HYDRO SURG PLUS, W/O TIP, DISP

## (undated) DEVICE — POSITIONING, THE PINK PAD, PIGAZZI SYSTEM

## (undated) DEVICE — DRIVER, NEEDLE, MEGA SUTURE CUT, DAVINCI XI

## (undated) DEVICE — COVER, TIP HOT SHEARS ENDOWRIST

## (undated) DEVICE — SUTURE, VICRYL, 0, 36 IN, CT-1, UNDYED

## (undated) DEVICE — GLOVE, SURGICAL, PROTEXIS PI , 7.0, PF, LF

## (undated) DEVICE — SEALER, VESSEL, EXTENDED

## (undated) DEVICE — DRAPE, ARM XI

## (undated) DEVICE — CATHETER TRAY, URETHRAL, FOLEY, 16 FR, SILICONE

## (undated) DEVICE — ACCESS PORT, 12MM, 120MM LENGTH, LOW PROFILE W/BLADELESS OPTICAL TIP

## (undated) DEVICE — SCISSORS, MONOPOLAR, CURVED, 8MM

## (undated) DEVICE — OBTURATOR, BLADELESS , SU

## (undated) DEVICE — DRAPE, UNDERBUTTOCKS, W/FLUID CONTROL POUCH

## (undated) DEVICE — DRAPE, SHEET, LARGE, 70 X 85IN, STERILE

## (undated) DEVICE — SEAL, UNIVERSAL 5-8MM  XI

## (undated) DEVICE — ADHESIVE, SKIN, DERMABOND ADVANCED, 15CM, PEN-STYLE

## (undated) DEVICE — STAPLER, SKIN, PLUS, WIDE, 35

## (undated) DEVICE — POSITIONING SYSTEM, PAGAZZI, PATIENT